# Patient Record
Sex: MALE | URBAN - METROPOLITAN AREA
[De-identification: names, ages, dates, MRNs, and addresses within clinical notes are randomized per-mention and may not be internally consistent; named-entity substitution may affect disease eponyms.]

---

## 2017-09-06 ENCOUNTER — APPOINTMENT (OUTPATIENT)
Age: 57
Setting detail: DERMATOLOGY
End: 2017-09-09

## 2017-09-06 DIAGNOSIS — L82.0 INFLAMED SEBORRHEIC KERATOSIS: ICD-10-CM

## 2017-09-06 DIAGNOSIS — Z85.828 PERSONAL HISTORY OF OTHER MALIGNANT NEOPLASM OF SKIN: ICD-10-CM

## 2017-09-06 DIAGNOSIS — L57.0 ACTINIC KERATOSIS: ICD-10-CM

## 2017-09-06 PROCEDURE — 99202 OFFICE O/P NEW SF 15 MIN: CPT | Mod: 25

## 2017-09-06 PROCEDURE — OTHER COUNSELING: OTHER

## 2017-09-06 PROCEDURE — 17110 DESTRUCT B9 LESION 1-14: CPT

## 2017-09-06 PROCEDURE — OTHER LIQUID NITROGEN: OTHER

## 2017-09-06 PROCEDURE — OTHER MIPS QUALITY: OTHER

## 2017-09-06 PROCEDURE — OTHER BENIGN DESTRUCTION: OTHER

## 2017-09-06 PROCEDURE — 17000 DESTRUCT PREMALG LESION: CPT | Mod: 59

## 2017-09-06 ASSESSMENT — LOCATION DETAILED DESCRIPTION DERM
LOCATION DETAILED: RIGHT MEDIAL DISTAL PRETIBIAL REGION
LOCATION DETAILED: RIGHT SUPERIOR CENTRAL MALAR CHEEK
LOCATION DETAILED: LEFT CENTRAL FRONTAL SCALP
LOCATION DETAILED: RIGHT SUPERIOR MEDIAL MALAR CHEEK

## 2017-09-06 ASSESSMENT — LOCATION SIMPLE DESCRIPTION DERM
LOCATION SIMPLE: LEFT SCALP
LOCATION SIMPLE: RIGHT CHEEK
LOCATION SIMPLE: RIGHT PRETIBIAL REGION

## 2017-09-06 ASSESSMENT — LOCATION ZONE DERM
LOCATION ZONE: LEG
LOCATION ZONE: SCALP
LOCATION ZONE: FACE

## 2017-09-06 NOTE — PROCEDURE: LIQUID NITROGEN
Consent: The patient's consent was obtained including but not limited to risks of crusting, scabbing, blistering, scarring, darker or lighter pigmentary change, recurrence, incomplete removal and infection.
Duration Of Freeze Thaw-Cycle (Seconds): 15
Detail Level: Detailed
Post-Care Instructions: I reviewed with the patient in detail post-care instructions. Patient is to wear sunprotection, and avoid picking at any of the treated lesions. Pt may apply Vaseline to crusted or scabbing areas.
Render Post-Care Instructions In Note?: yes
Number Of Freeze-Thaw Cycles: 1 freeze-thaw cycle

## 2017-09-06 NOTE — PROCEDURE: BENIGN DESTRUCTION
Add 52 Modifier (Optional): no
Post-Care Instructions: I reviewed with the patient in detail post-care instructions. Patient is to wear sunprotection, and avoid picking at any of the treated lesions. Pt may apply Vaseline to crusted or scabbing areas.
Medical Necessity Clause: This procedure was medically necessary because the lesions that were treated were:
Render Post-Care Instructions In Note?: yes
Treatment Number (Will Not Render If 0): 1
Consent: The patient's consent was obtained including but not limited to risks of crusting, scabbing, blistering, scarring, darker or lighter pigmentary change, recurrence, incomplete removal and infection.
Medical Necessity Information: It is in your best interest to select a reason for this procedure from the list below. All of these items fulfill various CMS LCD requirements except the new and changing color options.
Detail Level: Detailed
Anesthesia Volume In Cc: 0.3

## 2017-09-06 NOTE — HPI: NON-MELANOMA SKIN CANCER F/U (HISTORY OF NMSC)
What Is The Reason For Today's Visit?: Surveillance against skin cancer recurrences
How Many Skin Cancers Have You Had?: more than one
When Was Your Last Cancer Diagnosed?: March 2017

## 2017-09-06 NOTE — PROCEDURE: MIPS QUALITY
Detail Level: Detailed
Quality 131: Pain Assessment And Follow-Up: Pain assessment using a standardized tool is documented as negative, no follow-up plan required
Quality 431: Preventive Care And Screening: Unhealthy Alcohol Use - Screening: Patient screened for unhealthy alcohol use using a single question and scores less than 2 times per year
Quality 110: Preventive Care And Screening: Influenza Immunization: Influenza Immunization previously received during influenza season
Quality 226: Preventive Care And Screening: Tobacco Use: Screening And Cessation Intervention: Patient screened for tobacco and never smoked
Quality 130: Documentation Of Current Medications In The Medical Record: Current Medications Documented

## 2018-04-25 ENCOUNTER — APPOINTMENT (OUTPATIENT)
Age: 58
Setting detail: DERMATOLOGY
End: 2018-05-02

## 2018-04-25 DIAGNOSIS — L82.1 OTHER SEBORRHEIC KERATOSIS: ICD-10-CM

## 2018-04-25 DIAGNOSIS — D22 MELANOCYTIC NEVI: ICD-10-CM

## 2018-04-25 DIAGNOSIS — L81.4 OTHER MELANIN HYPERPIGMENTATION: ICD-10-CM

## 2018-04-25 DIAGNOSIS — L82.0 INFLAMED SEBORRHEIC KERATOSIS: ICD-10-CM

## 2018-04-25 DIAGNOSIS — D18.0 HEMANGIOMA: ICD-10-CM

## 2018-04-25 PROBLEM — D48.5 NEOPLASM OF UNCERTAIN BEHAVIOR OF SKIN: Status: ACTIVE | Noted: 2018-04-25

## 2018-04-25 PROBLEM — D22.5 MELANOCYTIC NEVI OF TRUNK: Status: ACTIVE | Noted: 2018-04-25

## 2018-04-25 PROBLEM — D18.01 HEMANGIOMA OF SKIN AND SUBCUTANEOUS TISSUE: Status: ACTIVE | Noted: 2018-04-25

## 2018-04-25 PROCEDURE — OTHER COUNSELING: OTHER

## 2018-04-25 PROCEDURE — 99213 OFFICE O/P EST LOW 20 MIN: CPT | Mod: 25

## 2018-04-25 PROCEDURE — OTHER MIPS QUALITY: OTHER

## 2018-04-25 PROCEDURE — OTHER BIOPSY BY SHAVE METHOD: OTHER

## 2018-04-25 PROCEDURE — 11100: CPT | Mod: 59

## 2018-04-25 PROCEDURE — 17110 DESTRUCT B9 LESION 1-14: CPT

## 2018-04-25 PROCEDURE — OTHER LIQUID NITROGEN: OTHER

## 2018-04-25 ASSESSMENT — LOCATION DETAILED DESCRIPTION DERM
LOCATION DETAILED: LEFT CLAVICULAR NECK
LOCATION DETAILED: RIGHT CLAVICULAR NECK
LOCATION DETAILED: RIGHT INFERIOR UPPER BACK
LOCATION DETAILED: RIGHT SUPERIOR UPPER BACK
LOCATION DETAILED: MID-FRONTAL SCALP
LOCATION DETAILED: RIGHT MID-UPPER BACK
LOCATION DETAILED: INFERIOR THORACIC SPINE

## 2018-04-25 ASSESSMENT — LOCATION ZONE DERM
LOCATION ZONE: TRUNK
LOCATION ZONE: NECK
LOCATION ZONE: SCALP

## 2018-04-25 ASSESSMENT — LOCATION SIMPLE DESCRIPTION DERM
LOCATION SIMPLE: RIGHT UPPER BACK
LOCATION SIMPLE: UPPER BACK
LOCATION SIMPLE: ANTERIOR SCALP
LOCATION SIMPLE: RIGHT ANTERIOR NECK
LOCATION SIMPLE: LEFT ANTERIOR NECK

## 2018-04-25 NOTE — PROCEDURE: MIPS QUALITY
Detail Level: Detailed
Quality 110: Preventive Care And Screening: Influenza Immunization: Influenza Immunization Administered during Influenza season
Quality 226: Preventive Care And Screening: Tobacco Use: Screening And Cessation Intervention: Patient screened for tobacco and never smoked
Quality 130: Documentation Of Current Medications In The Medical Record: Current Medications Documented
Quality 265: Biopsy Follow-Up: Biopsy results reviewed, communicated, tracked, and documented
Quality 431: Preventive Care And Screening: Unhealthy Alcohol Use - Screening: Patient screened for unhealthy alcohol use using a single question and scores less than 2 times per year

## 2018-04-25 NOTE — PROCEDURE: LIQUID NITROGEN
Consent: The patient's verbal consent was obtained including but not limited to risks of crusting, scabbing, blistering, scarring, darker or lighter pigmentary change, recurrence, incomplete removal and infection.
Number Of Freeze-Thaw Cycles: 1 freeze-thaw cycle
Medical Necessity Clause: This procedure was medically necessary because the lesions that were treated were:
Duration Of Freeze Thaw-Cycle (Seconds): 15
Detail Level: Simple
Add 52 Modifier (Optional): no
Post-Care Instructions: I reviewed with the patient in detail post-care instructions. Patient is to wear sunprotection, and avoid picking at any of the treated lesions. Pt may apply Vaseline to crusted or scabbing areas.
Total Number Of Lesions Treated: 4
Medical Necessity Information: It is in your best interest to select a reason for this procedure from the list below. All of these items fulfill various CMS LCD requirements except the new and changing color options.
Render Post Care In The Note?: yes

## 2018-04-25 NOTE — PROCEDURE: BIOPSY BY SHAVE METHOD
Detail Level: Detailed
Render Post-Care Instructions In Note?: yes
Curettage Text: The wound bed was treated with curettage after the biopsy was performed.
Additional Anesthesia Volume In Cc (Will Not Render If 0): 0
Biopsy Method: double edge Personna blade
Hemostasis: Drysol
Anesthesia Type: 1% lidocaine with epinephrine
Post-Care Instructions: I reviewed with the patient in detail post-care instructions. Patient is to keep the biopsy site cover and dry overnight, and then apply H2O2,  vaseline  and a bandage daily until healed.
Silver Nitrate Text: The wound bed was treated with silver nitrate after the biopsy was performed.
Notification Instructions: Patient will be notified of biopsy results. However, patient instructed to call the office if not contacted within 2 weeks.
Size Of Lesion In Cm: 0.4
Electrodesiccation And Curettage Text: The wound bed was treated with electrodesiccation and curettage after the biopsy was performed.
Anesthesia Volume In Cc (Will Not Render If 0): 0.3
Billing Type: Third-Party Bill
Dressing: bandage
Type Of Destruction Used: Electrodesiccation
Biopsy Type: H and E
Bill For Surgical Tray: no
Consent: Written consent was obtained and risks were reviewed including but not limited to scarring, infection, bleeding, scabbing, incomplete removal, nerve damage and allergy to anesthesia.
Electrodesiccation Text: The wound bed was treated with electrodesiccation after the biopsy was performed.
Body Location Override (Optional - Billing Will Still Be Based On Selected Body Map Location If Applicable): mid upper back
Cryotherapy Text: The wound bed was treated with cryotherapy after the biopsy was performed.
Wound Care: Vaseline

## 2020-09-11 ENCOUNTER — APPOINTMENT (OUTPATIENT)
Dept: URBAN - METROPOLITAN AREA CLINIC 255 | Age: 60
Setting detail: DERMATOLOGY
End: 2020-09-12

## 2020-09-11 DIAGNOSIS — D485 NEOPLASM OF UNCERTAIN BEHAVIOR OF SKIN: ICD-10-CM

## 2020-09-11 DIAGNOSIS — L82.1 OTHER SEBORRHEIC KERATOSIS: ICD-10-CM

## 2020-09-11 DIAGNOSIS — L82.0 INFLAMED SEBORRHEIC KERATOSIS: ICD-10-CM

## 2020-09-11 PROBLEM — D48.5 NEOPLASM OF UNCERTAIN BEHAVIOR OF SKIN: Status: ACTIVE | Noted: 2020-09-11

## 2020-09-11 PROCEDURE — 99213 OFFICE O/P EST LOW 20 MIN: CPT | Mod: 25

## 2020-09-11 PROCEDURE — OTHER LIQUID NITROGEN: OTHER

## 2020-09-11 PROCEDURE — 17110 DESTRUCT B9 LESION 1-14: CPT

## 2020-09-11 PROCEDURE — 11102 TANGNTL BX SKIN SINGLE LES: CPT | Mod: 59

## 2020-09-11 PROCEDURE — OTHER COUNSELING: OTHER

## 2020-09-11 PROCEDURE — OTHER BIOPSY BY SHAVE METHOD: OTHER

## 2020-09-11 PROCEDURE — OTHER MIPS QUALITY: OTHER

## 2020-09-11 ASSESSMENT — LOCATION DETAILED DESCRIPTION DERM
LOCATION DETAILED: LEFT LATERAL UPPER BACK
LOCATION DETAILED: STERNUM
LOCATION DETAILED: LEFT ANTERIOR PROXIMAL THIGH
LOCATION DETAILED: INFERIOR THORACIC SPINE
LOCATION DETAILED: LEFT MID-UPPER BACK
LOCATION DETAILED: RIGHT LATERAL SUPERIOR CHEST
LOCATION DETAILED: LEFT LATERAL SUPERIOR CHEST

## 2020-09-11 ASSESSMENT — LOCATION ZONE DERM
LOCATION ZONE: TRUNK
LOCATION ZONE: LEG

## 2020-09-11 ASSESSMENT — LOCATION SIMPLE DESCRIPTION DERM
LOCATION SIMPLE: LEFT UPPER BACK
LOCATION SIMPLE: LEFT THIGH
LOCATION SIMPLE: UPPER BACK
LOCATION SIMPLE: CHEST

## 2020-09-11 NOTE — PROCEDURE: BIOPSY BY SHAVE METHOD
Destruction After The Procedure: No
Dressing: bandage
Depth Of Biopsy: dermis
Anesthesia Volume In Cc (Will Not Render If 0): 0.5
Information: Selecting Yes will display possible errors in your note based on the variables you have selected. This validation is only offered as a suggestion for you. PLEASE NOTE THAT THE VALIDATION TEXT WILL BE REMOVED WHEN YOU FINALIZE YOUR NOTE. IF YOU WANT TO FAX A PRELIMINARY NOTE YOU WILL NEED TO TOGGLE THIS TO 'NO' IF YOU DO NOT WANT IT IN YOUR FAXED NOTE.
Billing Type: Third-Party Bill
Additional Anesthesia Volume In Cc (Will Not Render If 0): 0
Silver Nitrate Text: The wound bed was treated with silver nitrate after the biopsy was performed.
Was A Bandage Applied: Yes
Electrodesiccation And Curettage Text: The wound bed was treated with electrodesiccation and curettage after the biopsy was performed.
Type Of Destruction Used: Curettage
Biopsy Type: H and E
Electrodesiccation Text: The wound bed was treated with electrodesiccation after the biopsy was performed.
Hemostasis: Drysol
Cryotherapy Text: The wound bed was treated with cryotherapy after the biopsy was performed.
Consent: Written consent was obtained and risks were reviewed including but not limited to scarring, infection, bleeding, scabbing, incomplete removal, nerve damage and allergy to anesthesia.
X Size Of Lesion In Cm: 0.7
Path Notes (To The Dermatopathologist): Please check margins
Anesthesia Type: 1% lidocaine with epinephrine and a 1:10 solution of 8.4% sodium bicarbonate
Curettage Text: The wound bed was treated with curettage after the biopsy was performed.
Biopsy Method: double edge Personna blade
Notification Instructions: Patient will be notified of biopsy results. However, patient instructed to call the office if not contacted within 2 weeks.
Size Of Lesion In Cm: 1
Detail Level: Detailed
Body Location Override (Optional - Billing Will Still Be Based On Selected Body Map Location If Applicable): Mid Upper Back
Wound Care: Petrolatum
Post-Care Instructions: I reviewed with the patient in detail post-care instructions. Patient is to keep the biopsy site dry overnight, and then apply bacitracin twice daily until healed. Patient may apply hydrogen peroxide soaks to remove any crusting.

## 2020-09-11 NOTE — PROCEDURE: MIPS QUALITY
Quality 431: Preventive Care And Screening: Unhealthy Alcohol Use - Screening: Patient screened for unhealthy alcohol use using a single question and scores less than 2 times per year
Quality 226: Preventive Care And Screening: Tobacco Use: Screening And Cessation Intervention: Patient screened for tobacco use and is an ex/non-smoker
Quality 265: Biopsy Follow-Up: Biopsy results reviewed, communicated, tracked, and documented
Detail Level: Detailed
Quality 130: Documentation Of Current Medications In The Medical Record: Current Medications Documented
Quality 110: Preventive Care And Screening: Influenza Immunization: Influenza Immunization not Administered because Patient Refused.

## 2020-09-11 NOTE — PROCEDURE: LIQUID NITROGEN
Duration Of Freeze Thaw-Cycle (Seconds): 15-20
Post-Care Instructions: I reviewed with the patient in detail post-care instructions. Patient is to wear sunprotection, and avoid picking at any of the treated lesions. Pt may apply Vaseline to crusted or scabbing areas.
Include Z78.9 (Other Specified Conditions Influencing Health Status) As An Associated Diagnosis?: No
Number Of Freeze-Thaw Cycles: 1 freeze-thaw cycle
Consent: The patient's consent was obtained including but not limited to risks of crusting, scabbing, blistering, scarring, darker or lighter pigmentary change, recurrence, incomplete removal and infection.
Medical Necessity Clause: This procedure was medically necessary because the lesions that were treated were:
Medical Necessity Information: It is in your best interest to select a reason for this procedure from the list below. All of these items fulfill various CMS LCD requirements except the new and changing color options.
Detail Level: Detailed

## 2020-10-01 ENCOUNTER — APPOINTMENT (OUTPATIENT)
Dept: URBAN - METROPOLITAN AREA CLINIC 255 | Age: 60
Setting detail: DERMATOLOGY
End: 2020-10-08

## 2020-10-01 DIAGNOSIS — L82.0 INFLAMED SEBORRHEIC KERATOSIS: ICD-10-CM

## 2020-10-01 PROBLEM — C44.519 BASAL CELL CARCINOMA OF SKIN OF OTHER PART OF TRUNK: Status: ACTIVE | Noted: 2020-10-01

## 2020-10-01 PROCEDURE — OTHER CURETTAGE AND DESTRUCTION: OTHER

## 2020-10-01 PROCEDURE — OTHER COUNSELING: OTHER

## 2020-10-01 PROCEDURE — OTHER MIPS QUALITY: OTHER

## 2020-10-01 PROCEDURE — 17262 DSTRJ MAL LES T/A/L 1.1-2.0: CPT

## 2020-10-01 PROCEDURE — 17110 DESTRUCT B9 LESION 1-14: CPT | Mod: 59

## 2020-10-01 PROCEDURE — OTHER LIQUID NITROGEN: OTHER

## 2020-10-01 ASSESSMENT — LOCATION DETAILED DESCRIPTION DERM
LOCATION DETAILED: RIGHT INFERIOR MEDIAL UPPER BACK
LOCATION DETAILED: RIGHT SUPERIOR MEDIAL MIDBACK
LOCATION DETAILED: INFERIOR THORACIC SPINE
LOCATION DETAILED: LEFT INFERIOR UPPER BACK
LOCATION DETAILED: LEFT LATERAL UPPER BACK
LOCATION DETAILED: LEFT SUPERIOR UPPER BACK
LOCATION DETAILED: RIGHT SUPERIOR LATERAL MIDBACK
LOCATION DETAILED: SUPERIOR THORACIC SPINE
LOCATION DETAILED: LEFT MID-UPPER BACK
LOCATION DETAILED: RIGHT MID-UPPER BACK
LOCATION DETAILED: RIGHT INFERIOR UPPER BACK

## 2020-10-01 ASSESSMENT — LOCATION SIMPLE DESCRIPTION DERM
LOCATION SIMPLE: RIGHT LOWER BACK
LOCATION SIMPLE: UPPER BACK
LOCATION SIMPLE: LEFT UPPER BACK
LOCATION SIMPLE: RIGHT UPPER BACK

## 2020-10-01 ASSESSMENT — LOCATION ZONE DERM: LOCATION ZONE: TRUNK

## 2020-10-01 NOTE — PROCEDURE: MIPS QUALITY
Quality 110: Preventive Care And Screening: Influenza Immunization: Influenza Immunization not Administered because Patient Refused.
Quality 431: Preventive Care And Screening: Unhealthy Alcohol Use - Screening: Patient screened for unhealthy alcohol use using a single question and scores less than 2 times per year
Detail Level: Detailed
Quality 143: Oncology: Medical And Radiation- Pain Intensity Quantified: Pain severity quantified, no pain present
Quality 226: Preventive Care And Screening: Tobacco Use: Screening And Cessation Intervention: Patient screened for tobacco use and is an ex/non-smoker
Quality 130: Documentation Of Current Medications In The Medical Record: Current Medications Documented

## 2020-10-01 NOTE — PROCEDURE: LIQUID NITROGEN
Add 52 Modifier (Optional): no
Detail Level: Detailed
Consent: The patient's consent was obtained including but not limited to risks of crusting, scabbing, blistering, scarring, darker or lighter pigmentary change, recurrence, incomplete removal and infection.
Number Of Freeze-Thaw Cycles: 1 freeze-thaw cycle
Duration Of Freeze Thaw-Cycle (Seconds): 15-20
Post-Care Instructions: I reviewed with the patient in detail post-care instructions. Patient is to wear sunprotection, and avoid picking at any of the treated lesions. Pt may apply Vaseline to crusted or scabbing areas.
Medical Necessity Information: It is in your best interest to select a reason for this procedure from the list below. All of these items fulfill various CMS LCD requirements except the new and changing color options.
Medical Necessity Clause: This procedure was medically necessary because the lesions that were treated were:

## 2020-10-01 NOTE — PROCEDURE: CURETTAGE AND DESTRUCTION
Hide Accession Number?: No
Anesthesia Volume In Cc: 2
Consent was obtained from the patient. The risks, benefits and alternatives to therapy were discussed in detail. Specifically, the risks of infection, scarring, bleeding, prolonged wound healing, nerve injury, incomplete removal, allergy to anesthesia and recurrence were addressed. Alternatives to ED&C, such as: surgical removal and XRT were also discussed.  Prior to the procedure, the treatment site was clearly identified and confirmed by the patient. All components of Universal Protocol/PAUSE Rule completed.
What Was Performed First?: Curettage
Bill As A Line Item Or As Units: Line Item
Post-Care Instructions: I reviewed with the patient in detail post-care instructions. Patient is to keep the area dry for 48 hours, and not to engage in any swimming until the area is healed. Should the patient develop any fevers, chills, bleeding, severe pain patient will contact the office immediately.
Size Of Lesion After Curettage: 1.8
Size Of Lesion In Cm: 1.2
Additional Information: (Optional): The wound was cleaned, and vaseline and a bandage were applied.  The patient received detailed post-op instructions.
Detail Level: Detailed
Body Location Override (Optional - Billing Will Still Be Based On Selected Body Map Location If Applicable): Mid Upper Back
Number Of Curettages: 3
Anesthesia Type: 1% lidocaine with epinephrine and a 1:10 solution of 8.4% sodium bicarbonate
Cautery Type: electrodesiccation
Total Volume (Ccs): 1

## 2021-12-27 ENCOUNTER — APPOINTMENT (OUTPATIENT)
Dept: URBAN - METROPOLITAN AREA CLINIC 255 | Age: 61
Setting detail: DERMATOLOGY
End: 2021-12-28

## 2021-12-27 DIAGNOSIS — L82.1 OTHER SEBORRHEIC KERATOSIS: ICD-10-CM

## 2021-12-27 DIAGNOSIS — Z85.828 PERSONAL HISTORY OF OTHER MALIGNANT NEOPLASM OF SKIN: ICD-10-CM

## 2021-12-27 DIAGNOSIS — L82.0 INFLAMED SEBORRHEIC KERATOSIS: ICD-10-CM

## 2021-12-27 DIAGNOSIS — D18.0 HEMANGIOMA: ICD-10-CM

## 2021-12-27 DIAGNOSIS — L81.4 OTHER MELANIN HYPERPIGMENTATION: ICD-10-CM

## 2021-12-27 DIAGNOSIS — L91.8 OTHER HYPERTROPHIC DISORDERS OF THE SKIN: ICD-10-CM

## 2021-12-27 DIAGNOSIS — D22 MELANOCYTIC NEVI: ICD-10-CM

## 2021-12-27 PROBLEM — D22.5 MELANOCYTIC NEVI OF TRUNK: Status: ACTIVE | Noted: 2021-12-27

## 2021-12-27 PROBLEM — D18.01 HEMANGIOMA OF SKIN AND SUBCUTANEOUS TISSUE: Status: ACTIVE | Noted: 2021-12-27

## 2021-12-27 PROCEDURE — 99213 OFFICE O/P EST LOW 20 MIN: CPT | Mod: 25

## 2021-12-27 PROCEDURE — 17110 DESTRUCT B9 LESION 1-14: CPT

## 2021-12-27 PROCEDURE — OTHER LIQUID NITROGEN: OTHER

## 2021-12-27 PROCEDURE — OTHER MIPS QUALITY: OTHER

## 2021-12-27 PROCEDURE — OTHER COUNSELING: OTHER

## 2021-12-27 PROCEDURE — OTHER SKIN TAG REMOVAL: OTHER

## 2021-12-27 PROCEDURE — 11200 RMVL SKIN TAGS UP TO&INC 15: CPT | Mod: 59

## 2021-12-27 ASSESSMENT — LOCATION DETAILED DESCRIPTION DERM
LOCATION DETAILED: LEFT SUPERIOR FOREHEAD
LOCATION DETAILED: LEFT AXILLARY VAULT
LOCATION DETAILED: LEFT ANTERIOR PROXIMAL THIGH
LOCATION DETAILED: RIGHT AXILLARY VAULT
LOCATION DETAILED: LEFT SUPERIOR MEDIAL UPPER BACK
LOCATION DETAILED: RIGHT ANTERIOR AXILLA
LOCATION DETAILED: RIGHT INFERIOR MEDIAL UPPER BACK
LOCATION DETAILED: SUPERIOR MID FOREHEAD
LOCATION DETAILED: RIGHT MEDIAL UPPER BACK
LOCATION DETAILED: LEFT ANKLE
LOCATION DETAILED: RIGHT DISTAL PRETIBIAL REGION

## 2021-12-27 ASSESSMENT — LOCATION SIMPLE DESCRIPTION DERM
LOCATION SIMPLE: RIGHT ANTERIOR AXILLA
LOCATION SIMPLE: RIGHT AXILLARY VAULT
LOCATION SIMPLE: LEFT UPPER BACK
LOCATION SIMPLE: SUPERIOR FOREHEAD
LOCATION SIMPLE: RIGHT PRETIBIAL REGION
LOCATION SIMPLE: LEFT ANKLE
LOCATION SIMPLE: LEFT THIGH
LOCATION SIMPLE: RIGHT UPPER BACK
LOCATION SIMPLE: LEFT AXILLARY VAULT
LOCATION SIMPLE: LEFT FOREHEAD

## 2021-12-27 ASSESSMENT — LOCATION ZONE DERM
LOCATION ZONE: AXILLAE
LOCATION ZONE: FACE
LOCATION ZONE: TRUNK
LOCATION ZONE: LEG

## 2021-12-27 NOTE — PROCEDURE: LIQUID NITROGEN
Render Post-Care Instructions In Note?: no
Consent: The patient's consent was obtained including but not limited to risks of crusting, scabbing, blistering, scarring, darker or lighter pigmentary change, recurrence, incomplete removal and infection.
Number Of Freeze-Thaw Cycles: 1 freeze-thaw cycle
Post-Care Instructions: I reviewed with the patient in detail post-care instructions. Patient is to wear sunprotection, and avoid picking at any of the treated lesions. Pt may apply Vaseline to crusted or scabbing areas.
Medical Necessity Information: It is in your best interest to select a reason for this procedure from the list below. All of these items fulfill various CMS LCD requirements except the new and changing color options.
Detail Level: Detailed
Show Aperture Variable?: Yes
Medical Necessity Clause: This procedure was medically necessary because the lesions that were treated were:
Duration Of Freeze Thaw-Cycle (Seconds): 15-20

## 2021-12-27 NOTE — PROCEDURE: SKIN TAG REMOVAL
Detail Level: Detailed
Consent: Written consent obtained and the risks of skin tag removal was reviewed with the patient including but not limited to bleeding, pigmentary change, infection, pain, and remote possibility of scarring.
Medical Necessity Information: It is in your best interest to select a reason for this procedure from the list below. All of these items fulfill various CMS LCD requirements except the new and changing color options.
Include Z78.9 (Other Specified Conditions Influencing Health Status) As An Associated Diagnosis?: No
Anesthesia Volume In Cc: 3
Medical Necessity Clause: This procedure was medically necessary because the lesions that were treated were:
Anesthesia Type: 1% lidocaine with epinephrine
Add Associated Diagnoses If Applicable When Selecting Medical Necessity: Yes

## 2022-07-22 ENCOUNTER — APPOINTMENT (OUTPATIENT)
Dept: URBAN - METROPOLITAN AREA CLINIC 255 | Age: 62
Setting detail: DERMATOLOGY
End: 2022-07-23

## 2022-07-22 DIAGNOSIS — L82.0 INFLAMED SEBORRHEIC KERATOSIS: ICD-10-CM

## 2022-07-22 DIAGNOSIS — D485 NEOPLASM OF UNCERTAIN BEHAVIOR OF SKIN: ICD-10-CM

## 2022-07-22 PROBLEM — D48.5 NEOPLASM OF UNCERTAIN BEHAVIOR OF SKIN: Status: ACTIVE | Noted: 2022-07-22

## 2022-07-22 PROCEDURE — OTHER COUNSELING: OTHER

## 2022-07-22 PROCEDURE — 11102 TANGNTL BX SKIN SINGLE LES: CPT | Mod: 59

## 2022-07-22 PROCEDURE — OTHER LIQUID NITROGEN: OTHER

## 2022-07-22 PROCEDURE — OTHER BIOPSY BY SHAVE METHOD: OTHER

## 2022-07-22 PROCEDURE — 17110 DESTRUCT B9 LESION 1-14: CPT

## 2022-07-22 PROCEDURE — OTHER MIPS QUALITY: OTHER

## 2022-07-22 ASSESSMENT — LOCATION DETAILED DESCRIPTION DERM
LOCATION DETAILED: PERIUMBILICAL SKIN
LOCATION DETAILED: RIGHT DISTAL CALF
LOCATION DETAILED: LEFT CLAVICULAR NECK
LOCATION DETAILED: LEFT MEDIAL MALAR CHEEK
LOCATION DETAILED: LEFT CENTRAL EYEBROW
LOCATION DETAILED: LEFT INFERIOR ANTERIOR NECK

## 2022-07-22 ASSESSMENT — LOCATION ZONE DERM
LOCATION ZONE: LEG
LOCATION ZONE: FACE
LOCATION ZONE: TRUNK
LOCATION ZONE: NECK

## 2022-07-22 ASSESSMENT — LOCATION SIMPLE DESCRIPTION DERM
LOCATION SIMPLE: RIGHT CALF
LOCATION SIMPLE: LEFT CHEEK
LOCATION SIMPLE: ABDOMEN
LOCATION SIMPLE: LEFT ANTERIOR NECK
LOCATION SIMPLE: LEFT EYEBROW

## 2022-07-22 NOTE — PROCEDURE: MIPS QUALITY
Quality 130: Documentation Of Current Medications In The Medical Record: Current Medications Documented
Detail Level: Detailed
Quality 110: Preventive Care And Screening: Influenza Immunization: Influenza Immunization not Administered because Patient Refused.
Quality 265: Biopsy Follow-Up: Biopsy results reviewed, communicated, tracked, and documented
Quality 226: Preventive Care And Screening: Tobacco Use: Screening And Cessation Intervention: Patient screened for tobacco use and is an ex/non-smoker
Quality 431: Preventive Care And Screening: Unhealthy Alcohol Use - Screening: Patient not identified as an unhealthy alcohol user when screened for unhealthy alcohol use using a systematic screening method

## 2022-07-22 NOTE — PROCEDURE: LIQUID NITROGEN
Spray Paint Text: The liquid nitrogen was applied to the skin utilizing a spray paint frosting technique.
Detail Level: Detailed
Include Z78.9 (Other Specified Conditions Influencing Health Status) As An Associated Diagnosis?: No
Show Aperture Variable?: Yes
Pared With?: 15 blade
Post-Care Instructions: I reviewed with the patient in detail post-care instructions. Patient is to wear sunprotection, and avoid picking at any of the treated lesions. Pt may apply Vaseline to crusted or scabbing areas.
Duration Of Freeze Thaw-Cycle (Seconds): 15-20
Consent: The patient's consent was obtained including but not limited to risks of crusting, scabbing, blistering, scarring, darker or lighter pigmentary change, recurrence, incomplete removal and infection.
Medical Necessity Information: It is in your best interest to select a reason for this procedure from the list below. All of these items fulfill various CMS LCD requirements except the new and changing color options.
Medical Necessity Clause: This procedure was medically necessary because the lesions that were treated were:
Number Of Freeze-Thaw Cycles: 1 freeze-thaw cycle

## 2022-07-22 NOTE — PROCEDURE: BIOPSY BY SHAVE METHOD
Dressing: Band-Aid
Hide Accession Number?: No
Consent: Written consent was obtained and risks were reviewed including but not limited to scarring, infection, bleeding, scabbing, incomplete removal, nerve damage and allergy to anesthesia.
Detail Level: Detailed
Path Notes (To The Dermatopathologist): Please Check Margins
Information: Selecting Yes will display possible errors in your note based on the variables you have selected. This validation is only offered as a suggestion for you. PLEASE NOTE THAT THE VALIDATION TEXT WILL BE REMOVED WHEN YOU FINALIZE YOUR NOTE. IF YOU WANT TO FAX A PRELIMINARY NOTE YOU WILL NEED TO TOGGLE THIS TO 'NO' IF YOU DO NOT WANT IT IN YOUR FAXED NOTE.
Electrodesiccation And Curettage Text: The wound bed was treated with electrodesiccation and curettage after the biopsy was performed.
Notification Instructions: Patient will be notified of biopsy results. However, patient instructed to call the office if not contacted within 2 weeks.
Anesthesia Volume In Cc (Will Not Render If 0): 0.5
Post-Care Instructions: I reviewed with the patient in detail post-care instructions. Patient is to keep the biopsy site dry overnight, and then apply bacitracin twice daily until healed. Patient may apply hydrogen peroxide soaks to remove any crusting.
Additional Anesthesia Volume In Cc (Will Not Render If 0): 0
Body Location Override (Optional - Billing Will Still Be Based On Selected Body Map Location If Applicable): L Medial Eyebrow
Depth Of Biopsy: dermis
Electrodesiccation Text: The wound bed was treated with electrodesiccation after the biopsy was performed.
Validate Note Data (See Information Below): Yes
Hemostasis: Aluminum Chloride and Electrocautery
Cryotherapy Text: The wound bed was treated with cryotherapy after the biopsy was performed.
Billing Type: Third-Party Bill
Type Of Destruction Used: Curettage
Anesthesia Type: 1% lidocaine with epinephrine and a 1:10 solution of 8.4% sodium bicarbonate
Biopsy Type: H and E
Wound Care: Petrolatum
Biopsy Method: double edge Personna blade
X Size Of Lesion In Cm: 0.7
Silver Nitrate Text: The wound bed was treated with silver nitrate after the biopsy was performed.
Curettage Text: The wound bed was treated with curettage after the biopsy was performed.

## 2023-04-13 ENCOUNTER — APPOINTMENT (OUTPATIENT)
Dept: URBAN - METROPOLITAN AREA CLINIC 259 | Age: 63
Setting detail: DERMATOLOGY
End: 2023-04-13

## 2023-04-13 DIAGNOSIS — L57.0 ACTINIC KERATOSIS: ICD-10-CM

## 2023-04-13 DIAGNOSIS — L82.0 INFLAMED SEBORRHEIC KERATOSIS: ICD-10-CM

## 2023-04-13 PROCEDURE — 17110 DESTRUCT B9 LESION 1-14: CPT

## 2023-04-13 PROCEDURE — 17000 DESTRUCT PREMALG LESION: CPT | Mod: 59

## 2023-04-13 PROCEDURE — OTHER MIPS QUALITY: OTHER

## 2023-04-13 PROCEDURE — OTHER LIQUID NITROGEN: OTHER

## 2023-04-13 PROCEDURE — OTHER COUNSELING: OTHER

## 2023-04-13 ASSESSMENT — LOCATION DETAILED DESCRIPTION DERM
LOCATION DETAILED: LEFT MEDIAL UPPER BACK
LOCATION DETAILED: RIGHT CLAVICULAR NECK
LOCATION DETAILED: RIGHT INFERIOR UPPER BACK
LOCATION DETAILED: LEFT ANTERIOR DISTAL THIGH
LOCATION DETAILED: LEFT CLAVICULAR NECK
LOCATION DETAILED: LEFT SUPERIOR LATERAL LOWER BACK
LOCATION DETAILED: RIGHT INFERIOR LATERAL UPPER BACK
LOCATION DETAILED: LEFT DISTAL RADIAL DORSAL FOREARM

## 2023-04-13 ASSESSMENT — LOCATION ZONE DERM
LOCATION ZONE: LEG
LOCATION ZONE: TRUNK
LOCATION ZONE: ARM
LOCATION ZONE: NECK

## 2023-04-13 ASSESSMENT — LOCATION SIMPLE DESCRIPTION DERM
LOCATION SIMPLE: LEFT LOWER BACK
LOCATION SIMPLE: LEFT UPPER BACK
LOCATION SIMPLE: LEFT ANTERIOR NECK
LOCATION SIMPLE: LEFT FOREARM
LOCATION SIMPLE: RIGHT UPPER BACK
LOCATION SIMPLE: RIGHT ANTERIOR NECK
LOCATION SIMPLE: LEFT THIGH

## 2023-04-13 NOTE — PROCEDURE: LIQUID NITROGEN
Post-Care Instructions: I reviewed with the patient in detail post-care instructions. Patient is to wear sunprotection, and avoid picking at any of the treated lesions. Pt may apply Vaseline to crusted or scabbing areas.
Consent: The patient's consent was obtained including but not limited to risks of crusting, scabbing, blistering, scarring, darker or lighter pigmentary change, recurrence, incomplete removal and infection.
Render Note In Bullet Format When Appropriate: No
Duration Of Freeze Thaw-Cycle (Seconds): 0
Medical Necessity Information: It is in your best interest to select a reason for this procedure from the list below. All of these items fulfill various CMS LCD requirements except the new and changing color options.
Show Applicator Variable?: Yes
Spray Paint Text: The liquid nitrogen was applied to the skin utilizing a spray paint frosting technique.
Detail Level: Detailed
Medical Necessity Clause: This procedure was medically necessary because the lesions that were treated were:

## 2023-04-13 NOTE — HPI: SKIN LESION
What Type Of Note Output Would You Prefer (Optional)?: Standard Output
Is This A New Presentation, Or A Follow-Up?: Skin Lesions
Additional History: Pt states he has moles on his back, leg, neck, and arms that has been present for years, are raised, and periodically itchy.

## 2024-09-13 ENCOUNTER — APPOINTMENT (OUTPATIENT)
Dept: URBAN - METROPOLITAN AREA CLINIC 259 | Age: 64
Setting detail: DERMATOLOGY
End: 2024-09-16

## 2024-09-13 DIAGNOSIS — L91.8 OTHER HYPERTROPHIC DISORDERS OF THE SKIN: ICD-10-CM

## 2024-09-13 DIAGNOSIS — L57.8 OTHER SKIN CHANGES DUE TO CHRONIC EXPOSURE TO NONIONIZING RADIATION: ICD-10-CM

## 2024-09-13 DIAGNOSIS — L82.0 INFLAMED SEBORRHEIC KERATOSIS: ICD-10-CM

## 2024-09-13 DIAGNOSIS — D22 MELANOCYTIC NEVI: ICD-10-CM

## 2024-09-13 DIAGNOSIS — Z71.89 OTHER SPECIFIED COUNSELING: ICD-10-CM

## 2024-09-13 DIAGNOSIS — L82.1 OTHER SEBORRHEIC KERATOSIS: ICD-10-CM

## 2024-09-13 PROBLEM — D22.5 MELANOCYTIC NEVI OF TRUNK: Status: ACTIVE | Noted: 2024-09-13

## 2024-09-13 PROCEDURE — OTHER MIPS QUALITY: OTHER

## 2024-09-13 PROCEDURE — OTHER LIQUID NITROGEN: OTHER

## 2024-09-13 PROCEDURE — 99213 OFFICE O/P EST LOW 20 MIN: CPT | Mod: 25

## 2024-09-13 PROCEDURE — 17110 DESTRUCT B9 LESION 1-14: CPT

## 2024-09-13 PROCEDURE — 11201 RMVL SKIN TAGS EA ADDL 10: CPT

## 2024-09-13 PROCEDURE — OTHER COUNSELING: OTHER

## 2024-09-13 PROCEDURE — 11200 RMVL SKIN TAGS UP TO&INC 15: CPT | Mod: 59

## 2024-09-13 PROCEDURE — OTHER SKIN TAG REMOVAL: OTHER

## 2024-09-13 ASSESSMENT — LOCATION DETAILED DESCRIPTION DERM
LOCATION DETAILED: RIGHT CLAVICULAR NECK
LOCATION DETAILED: RIGHT CLAVICULAR SKIN
LOCATION DETAILED: LEFT CLAVICULAR SKIN
LOCATION DETAILED: LEFT MEDIAL SUPERIOR CHEST
LOCATION DETAILED: INFERIOR THORACIC SPINE
LOCATION DETAILED: LEFT INFERIOR CENTRAL MALAR CHEEK
LOCATION DETAILED: LEFT AXILLARY VAULT
LOCATION DETAILED: LEFT CLAVICULAR NECK
LOCATION DETAILED: RIGHT INFERIOR LATERAL NECK
LOCATION DETAILED: RIGHT MEDIAL UPPER BACK
LOCATION DETAILED: LEFT CENTRAL LATERAL NECK
LOCATION DETAILED: RIGHT AXILLARY VAULT

## 2024-09-13 ASSESSMENT — LOCATION SIMPLE DESCRIPTION DERM
LOCATION SIMPLE: CHEST
LOCATION SIMPLE: RIGHT CLAVICULAR SKIN
LOCATION SIMPLE: LEFT AXILLARY VAULT
LOCATION SIMPLE: LEFT CLAVICULAR SKIN
LOCATION SIMPLE: LEFT ANTERIOR NECK
LOCATION SIMPLE: NECK
LOCATION SIMPLE: RIGHT ANTERIOR NECK
LOCATION SIMPLE: UPPER BACK
LOCATION SIMPLE: RIGHT UPPER BACK
LOCATION SIMPLE: LEFT CHEEK
LOCATION SIMPLE: RIGHT AXILLARY VAULT

## 2024-09-13 ASSESSMENT — LOCATION ZONE DERM
LOCATION ZONE: AXILLAE
LOCATION ZONE: NECK
LOCATION ZONE: FACE
LOCATION ZONE: TRUNK

## 2024-09-13 NOTE — PROCEDURE: LIQUID NITROGEN
Medical Necessity Clause: This procedure was medically necessary because the lesions that were treated were:
Include Z78.9 (Other Specified Conditions Influencing Health Status) As An Associated Diagnosis?: No
Consent: The patient's consent was obtained including but not limited to risks of crusting, scabbing, blistering, scarring, darker or lighter pigmentary change, recurrence, incomplete removal and infection.
Show Topical Anesthesia Variable?: Yes
Spray Paint Text: The liquid nitrogen was applied to the skin utilizing a spray paint frosting technique.
Medical Necessity Information: It is in your best interest to select a reason for this procedure from the list below. All of these items fulfill various CMS LCD requirements except the new and changing color options.
Detail Level: Detailed
Number Of Freeze-Thaw Cycles: 2 freeze-thaw cycles
Application Tool (Optional): Liquid Nitrogen Sprayer
Duration Of Freeze Thaw-Cycle (Seconds): 5-10
Post-Care Instructions: I reviewed with the patient in detail post-care instructions. Patient is to wear sunprotection, and avoid picking at any of the treated lesions. Pt may apply Vaseline to crusted or scabbing areas.

## 2024-09-13 NOTE — PROCEDURE: SKIN TAG REMOVAL
Medical Necessity Clause: This procedure was medically necessary because the lesions that were treated were:
Consent: Written consent obtained and the risks of skin tag removal was reviewed with the patient including but not limited to bleeding, pigmentary change, infection, pain, and remote possibility of scarring.
Medical Necessity Information: It is in your best interest to select a reason for this procedure from the list below. All of these items fulfill various CMS LCD requirements except the new and changing color options.
Detail Level: Detailed
Add Associated Diagnoses If Applicable When Selecting Medical Necessity: Yes
Include Z78.9 (Other Specified Conditions Influencing Health Status) As An Associated Diagnosis?: No

## 2024-09-13 NOTE — HPI: SKIN LESIONS
How Severe Is Your Skin Lesion?: mild
Have Your Skin Lesions Been Treated?: not been treated
Is This A New Presentation, Or A Follow-Up?: Skin Lesions
Additional History: Pt reports noticing a spots on their chest, armpits, neck, and back that they would like checked today. Pt reports no other concerns at this time.

## 2024-09-26 ENCOUNTER — TRANSFERRED RECORDS (OUTPATIENT)
Dept: HEALTH INFORMATION MANAGEMENT | Facility: CLINIC | Age: 64
End: 2024-09-26
Payer: COMMERCIAL

## 2024-10-08 ENCOUNTER — TRANSCRIBE ORDERS (OUTPATIENT)
Dept: OTHER | Age: 64
End: 2024-10-08

## 2024-10-08 ENCOUNTER — MEDICAL CORRESPONDENCE (OUTPATIENT)
Dept: HEALTH INFORMATION MANAGEMENT | Facility: CLINIC | Age: 64
End: 2024-10-08
Payer: COMMERCIAL

## 2024-10-08 ENCOUNTER — PATIENT OUTREACH (OUTPATIENT)
Dept: ONCOLOGY | Facility: CLINIC | Age: 64
End: 2024-10-08
Payer: COMMERCIAL

## 2024-10-08 DIAGNOSIS — R79.89 ELEVATED FERRITIN: Primary | ICD-10-CM

## 2024-10-08 DIAGNOSIS — D68.51 FACTOR 5 LEIDEN MUTATION, HETEROZYGOUS (H): ICD-10-CM

## 2024-10-08 NOTE — PROGRESS NOTES
Hematology referral reviewed for [facility], see below.    Referral reason: referral received via fax from [facility] for elevated ferritin and Factor V, per below screen shot, referral flagged for Records Needed and patient advised via VM that once we have records, we will reach out for an appointment, left call back information

## 2024-10-09 ENCOUNTER — PRE VISIT (OUTPATIENT)
Dept: ONCOLOGY | Facility: CLINIC | Age: 64
End: 2024-10-09
Payer: COMMERCIAL

## 2024-10-09 NOTE — TELEPHONE ENCOUNTER
RECORDS STATUS - ALL OTHER DIAGNOSIS      RECORDS RECEIVED FROM: [facility]   NOTES STATUS DETAILS   OFFICE NOTE from referring provider External: [facility] Med 09/26/24: Dr. Sachin Dozier   OFFICE NOTE from medical oncologist     OFFICE NOTE from other specialist     DISCHARGE SUMMARY from hospital     DISCHARGE REPORT from the ER     OPERATIVE REPORT     MEDICATION LIST External: [facility]    LABS     PATHOLOGY REPORTS     ANYTHING RELATED TO DIAGNOSIS External: [facility] Med/[facility] Most recent 08/20/24   PATHOLOGY FEDEX TRACKING   Tracking #:   GENONOMIC TESTING     TYPE:     IMAGING (NEED IMAGES & REPORT)     CT SCANS     MRI     XRAYS     ULTRASOUND     PET     IMAGE DISC FEDEX TRACKING   Tracking #:

## 2024-10-10 ENCOUNTER — PATIENT OUTREACH (OUTPATIENT)
Dept: ONCOLOGY | Facility: CLINIC | Age: 64
End: 2024-10-10
Payer: COMMERCIAL

## 2024-10-10 NOTE — PROGRESS NOTES
Hematology referral reviewed for [facility], see below.    Referral reason: referral received from external primary care for elevated ferritin, mother with h/o hemochromatosis, negative work unit(s) thus far but ferritin per below, also with Factor V        Clinical question entered by referring provider or through order transcription: fax    Referral received via: Fax referral from [facility]    Current abnormal labs: Available in Media tab - referred by Dr Dozier    Plan: Triage instructions updated and sent to NPS for completion.

## 2024-11-17 NOTE — PROGRESS NOTES
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    Impression:  His history of recurrent deep venous thrombosis really involves distal lower extremity deep venous events; one in April, 2020 and another which is of questionable significance in July, 2024.  The latter event may be thrombus which is chronic and well-organized.  Neither event were truly proximal deep venous thromboses and therefore not at risk for thromboembolization.  Factor V Leiden gene mutations are really not that thrombogenic and we seldom make decisions about long-term anticoagulation based on that factor alone.  His future annual risk of major bleeding is about 1.5% with rivaroxaban.  The risk of continuing anticoagulation need to be weighed.  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  We want a rule out hepatic fibrosis from obesity and hepatic steatosis.  Other causes of hepatocellular inflammation need to be excluded.  Heterozygous factor V Leiden gene mutation  Obesity with no significant alcohol history.    Recommendation, plan, instructions:  1.  Hepatitis serologies, alpha-fetoprotein, ceruloplasmin, alpha-1 antitrypsin, cardiolipin antibody, beta-2 glycoprotein antibody.  2. Fibroscan; MR Bear to measure liver iron which may help us determine if therapeutic phlebotomy is indicated.  3. Follow-up Dr. Ruiz after test results back.  I am probably inclined to discontinue anticoagulation at this time and will also probably recommend a 20-30 mmHg pressure below-knee left compression stocking.  Will make these decisions after reviewed above tests and  discussed matters with the patient and his wife.  I also shared with him article from Up-to-date regarding the issues of surrounding indefinite anticoagulation for deep venous thrombosis.    Time with patient including review, documentation, history, examination, coordination of care and counseling was 60 minutes.    History of present illness:  Review of the patient's medical record furnished by his referring physician reveals a history of obesity, obstructive sleep apnea with inconsistent CPAP use, rivaroxaban therapy for apparent recurrent DVT and elevated serum ferritin's documented multiple times over 1000 within the last 2 years.    Hemochromatosis gene assay was negative as was protein C protein S, Antithrombin III and prothrombin gene mutations.  However he has a heterozygous positive factor V Leiden mutant gene.  Autoantibody panel and comprehensive metabolic panel were unremarkable including liver function studies.  His TSH was 1.9.  Hemoglobin A1c was 5.7%.  He had a March, 2024 colonoscopy which revealed a tubular adenoma without dysplasia.  A 8/29/2024 CT chest with contrast reviewed no acute findings with diffuse hepatic steatosis, tiny subcentimeter left aortic lymph nodes, a moderate amount of colonic stool and degenerative changes in the spine.    Past medical history:  No past medical history on file.      Family history:  I have reviewed this patient's family history and updated it with pertinent information if needed.  Family History   Problem Relation Age of Onset    Factor V Leiden deficiency Mother     Hypertension Sister     Factor V Leiden deficiency Brother            Medications:  Current Outpatient Medications   Medication Sig Dispense Refill    Ergocalciferol (VITAMIN D2) 50 MCG (2000 UT) TABS Take by mouth.      topiramate (TOPAMAX) 50 MG tablet Take 50 mg by mouth at bedtime.      vitamin C (ASCORBIC ACID) 1000 MG TABS Take 1,000 mg by mouth daily.      XARELTO ANTICOAGULANT 20 MG TABS  "tablet TAKE 1 TABLET BY MOUTH EVERY DAY WITH MEAL*      zinc 50 MG TABS Take by mouth.       No current facility-administered medications for this visit.       Allergies:  Allergies   Allergen Reactions    Tetracycline Rash       Review of systems:  Except as note above, the patient denies:  Headache, double vision, change in vision, hearing loss, tinnitus;  Dyspnea, chest pain, palpitations, pleurisy or hemoptysis;  Anorexia, nausea, vomiting, diarrhea, constipation, rectal bleeding bleeding, change in bowel habits;  Urinary frequency, burning or hematuria;  Fever, chills, sweats, change in appetite;  Bone or back pain; skin rash, joint swelling, new lumps;  Unexplained, bleeding or bruising, tingling numbness, change in gait;    Physical examination:  /75 (BP Location: Left arm, Patient Position: Chair, Cuff Size: Adult Large)   Pulse 60   Resp 16   Ht 1.791 m (5' 10.5\")   Wt 119.3 kg (263 lb)   SpO2 98%   BMI 37.20 kg/m      The patient is alert and oriented.   Throat and oral mucosa are normal-appearing.  Adenopathy is absent in the neck, axilla, groin and supraclavicular fossa;   Lungs are clear to auscultation and percussion without rales, wheezes or rubs; Heart rhythm is regular, heart sounds are without murmurs or gallops;   Abdomen is soft and flat without hepatosplenomegaly, masses, ascites or tenderness;   Extremities and skin reveal no unusual skin lesions, joint swelling or edema;    George Ruiz MD            "

## 2024-11-21 NOTE — TELEPHONE ENCOUNTER
RECORDS RECEIVED FROM: [facility]   NOTES STATUS DETAILS   OFFICE NOTE from referring provider External: [facility] Med 09/26/24: Dr. Sachin Dozier   OFFICE NOTE from medical oncologist       OFFICE NOTE from other specialist       DISCHARGE SUMMARY from hospital       DISCHARGE REPORT from the ER       OPERATIVE REPORT       MEDICATION LIST External: [facility]     LABS       PATHOLOGY REPORTS       ANYTHING RELATED TO DIAGNOSIS External: [facility] Med/[facility] Most recent 08/20/2

## 2024-11-26 ENCOUNTER — ONCOLOGY VISIT (OUTPATIENT)
Dept: ONCOLOGY | Facility: CLINIC | Age: 64
End: 2024-11-26
Attending: FAMILY MEDICINE
Payer: COMMERCIAL

## 2024-11-26 ENCOUNTER — PRE VISIT (OUTPATIENT)
Dept: ONCOLOGY | Facility: CLINIC | Age: 64
End: 2024-11-26
Payer: COMMERCIAL

## 2024-11-26 VITALS
BODY MASS INDEX: 36.82 KG/M2 | HEIGHT: 71 IN | DIASTOLIC BLOOD PRESSURE: 75 MMHG | OXYGEN SATURATION: 98 % | HEART RATE: 60 BPM | RESPIRATION RATE: 16 BRPM | SYSTOLIC BLOOD PRESSURE: 133 MMHG | WEIGHT: 263 LBS

## 2024-11-26 DIAGNOSIS — R79.89 ELEVATED FERRITIN: ICD-10-CM

## 2024-11-26 DIAGNOSIS — K76.0 HEPATIC STEATOSIS: Primary | ICD-10-CM

## 2024-11-26 DIAGNOSIS — Z11.59 ENCOUNTER FOR SCREENING FOR OTHER VIRAL DISEASES: ICD-10-CM

## 2024-11-26 DIAGNOSIS — K74.01 HEPATIC FIBROSIS, EARLY FIBROSIS: ICD-10-CM

## 2024-11-26 DIAGNOSIS — I82.532 CHRONIC DEEP VEIN THROMBOSIS (DVT) OF POPLITEAL VEIN OF LEFT LOWER EXTREMITY (H): ICD-10-CM

## 2024-11-26 PROCEDURE — 36415 COLL VENOUS BLD VENIPUNCTURE: CPT | Performed by: INTERNAL MEDICINE

## 2024-11-26 PROCEDURE — G0463 HOSPITAL OUTPT CLINIC VISIT: HCPCS | Performed by: INTERNAL MEDICINE

## 2024-11-26 PROCEDURE — 86146 BETA-2 GLYCOPROTEIN ANTIBODY: CPT | Performed by: INTERNAL MEDICINE

## 2024-11-26 PROCEDURE — 99205 OFFICE O/P NEW HI 60 MIN: CPT | Performed by: INTERNAL MEDICINE

## 2024-11-26 PROCEDURE — 87522 HEPATITIS C REVRS TRNSCRPJ: CPT | Performed by: INTERNAL MEDICINE

## 2024-11-26 PROCEDURE — 87340 HEPATITIS B SURFACE AG IA: CPT | Performed by: INTERNAL MEDICINE

## 2024-11-26 PROCEDURE — 82104 ALPHA-1-ANTITRYPSIN PHENO: CPT | Performed by: INTERNAL MEDICINE

## 2024-11-26 PROCEDURE — 86147 CARDIOLIPIN ANTIBODY EA IG: CPT | Performed by: INTERNAL MEDICINE

## 2024-11-26 PROCEDURE — 82105 ALPHA-FETOPROTEIN SERUM: CPT | Performed by: INTERNAL MEDICINE

## 2024-11-26 PROCEDURE — 86704 HEP B CORE ANTIBODY TOTAL: CPT | Performed by: INTERNAL MEDICINE

## 2024-11-26 PROCEDURE — 86803 HEPATITIS C AB TEST: CPT | Performed by: INTERNAL MEDICINE

## 2024-11-26 RX ORDER — MULTIVIT WITH MINERALS/LUTEIN
1000 TABLET ORAL DAILY
COMMUNITY

## 2024-11-26 RX ORDER — CHOLECALCIFEROL (VITAMIN D3) 125 MCG
TABLET ORAL
COMMUNITY

## 2024-11-26 RX ORDER — RIVAROXABAN 20 MG/1
TABLET, FILM COATED ORAL
COMMUNITY
Start: 2024-11-20

## 2024-11-26 RX ORDER — TOPIRAMATE 50 MG/1
50 TABLET, FILM COATED ORAL AT BEDTIME
COMMUNITY
Start: 2024-08-20

## 2024-11-26 RX ORDER — GINSENG 100 MG
CAPSULE ORAL
COMMUNITY

## 2024-11-26 ASSESSMENT — PAIN SCALES - GENERAL: PAINLEVEL_OUTOF10: NO PAIN (0)

## 2024-11-26 NOTE — LETTER
11/26/2024      Quentin Wheeler  [address] [address]      Dear Colleague,    Thank you for referring your patient, Quentin Wheeler, to the [facility]. Please see a copy of my visit note below.    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    Impression:  His history of recurrent deep venous thrombosis really involves distal lower extremity deep venous events; one in April, 2020 and another which is of questionable significance in July, 2024.  The latter event may be thrombus which is chronic and well-organized.  Neither event were truly proximal deep venous thromboses and therefore not at risk for thromboembolization.  Factor V Leiden gene mutations are really not that thrombogenic and we seldom make decisions about long-term anticoagulation based on that factor alone.  His future annual risk of major bleeding is about 1.5% with rivaroxaban.  The risk of continuing anticoagulation need to be weighed.  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  We want a rule out hepatic fibrosis from obesity and hepatic steatosis.  Other causes of hepatocellular inflammation need to be excluded.  Heterozygous factor V Leiden gene mutation  Obesity with no significant alcohol history.    Recommendation, plan, instructions:  1.  Hepatitis serologies, alpha-fetoprotein, ceruloplasmin, alpha-1 antitrypsin, cardiolipin antibody, beta-2 glycoprotein antibody.  2. Fibroscan; MR Sifuentes to measure liver iron which may help us determine if therapeutic phlebotomy is indicated.  3. Follow-up Dr. Ruiz  after test results back.  I am probably inclined to discontinue anticoagulation at this time and will also probably recommend a 20-30 mmHg pressure below-knee left compression stocking.  Will make these decisions after reviewed above tests and discussed matters with the patient and his wife.  I also shared with him article from Up-to-date regarding the issues of surrounding indefinite anticoagulation for deep venous thrombosis.    Time with patient including review, documentation, history, examination, coordination of care and counseling was 60 minutes.    History of present illness:  Review of the patient's medical record furnished by his referring physician reveals a history of obesity, obstructive sleep apnea with inconsistent CPAP use, rivaroxaban therapy for apparent recurrent DVT and elevated serum ferritin's documented multiple times over 1000 within the last 2 years.    Hemochromatosis gene assay was negative as was protein C protein S, Antithrombin III and prothrombin gene mutations.  However he has a heterozygous positive factor V Leiden mutant gene.  Autoantibody panel and comprehensive metabolic panel were unremarkable including liver function studies.  His TSH was 1.9.  Hemoglobin A1c was 5.7%.  He had a March, 2024 colonoscopy which revealed a tubular adenoma without dysplasia.  A 8/29/2024 CT chest with contrast reviewed no acute findings with diffuse hepatic steatosis, tiny subcentimeter left aortic lymph nodes, a moderate amount of colonic stool and degenerative changes in the spine.    Past medical history:  No past medical history on file.      Family history:  I have reviewed this patient's family history and updated it with pertinent information if needed.  Family History   Problem Relation Age of Onset     Factor V Leiden deficiency Mother      Hypertension Sister      Factor V Leiden deficiency Brother            Medications:  Current Outpatient Medications   Medication Sig Dispense Refill      "Ergocalciferol (VITAMIN D2) 50 MCG (2000 UT) TABS Take by mouth.       topiramate (TOPAMAX) 50 MG tablet Take 50 mg by mouth at bedtime.       vitamin C (ASCORBIC ACID) 1000 MG TABS Take 1,000 mg by mouth daily.       XARELTO ANTICOAGULANT 20 MG TABS tablet TAKE 1 TABLET BY MOUTH EVERY DAY WITH MEAL*       zinc 50 MG TABS Take by mouth.       No current facility-administered medications for this visit.       Allergies:  Allergies   Allergen Reactions     Tetracycline Rash       Review of systems:  Except as note above, the patient denies:  Headache, double vision, change in vision, hearing loss, tinnitus;  Dyspnea, chest pain, palpitations, pleurisy or hemoptysis;  Anorexia, nausea, vomiting, diarrhea, constipation, rectal bleeding bleeding, change in bowel habits;  Urinary frequency, burning or hematuria;  Fever, chills, sweats, change in appetite;  Bone or back pain; skin rash, joint swelling, new lumps;  Unexplained, bleeding or bruising, tingling numbness, change in gait;    Physical examination:  /75 (BP Location: Left arm, Patient Position: Chair, Cuff Size: Adult Large)   Pulse 60   Resp 16   Ht 1.791 m (5' 10.5\")   Wt 119.3 kg (263 lb)   SpO2 98%   BMI 37.20 kg/m      The patient is alert and oriented.   Throat and oral mucosa are normal-appearing.  Adenopathy is absent in the neck, axilla, groin and supraclavicular fossa;   Lungs are clear to auscultation and percussion without rales, wheezes or rubs; Heart rhythm is regular, heart sounds are without murmurs or gallops;   Abdomen is soft and flat without hepatosplenomegaly, masses, ascites or tenderness;   Extremities and skin reveal no unusual skin lesions, joint swelling or edema;    George Ruiz MD              Again, thank you for allowing me to participate in the care of your patient.        Sincerely,        George Ruiz MD  "

## 2024-11-26 NOTE — NURSING NOTE
"Oncology Rooming Note    November 26, 2024 3:40 PM   Quentin Wheeler is a 64 year old male who presents for:    Chief Complaint   Patient presents with    Hematology     Elevated Ferritin     Initial Vitals: /75 (BP Location: Left arm, Patient Position: Chair, Cuff Size: Adult Large)   Pulse 60   Resp 16   Ht 1.791 m (5' 10.5\")   Wt 119.3 kg (263 lb)   SpO2 98%   BMI 37.20 kg/m   Estimated body mass index is 37.2 kg/m  as calculated from the following:    Height as of this encounter: 1.791 m (5' 10.5\").    Weight as of this encounter: 119.3 kg (263 lb). Body surface area is 2.44 meters squared.  No Pain (0) Comment: Data Unavailable   No LMP for male patient.  Allergies reviewed: Yes  Medications reviewed: Yes    Medications: Medication refills not needed today.  Pharmacy name entered into Pryv: [id number] [address]    Frailty Screening:   Is the patient here for a new oncology consult visit in cancer care? 2. No      Clinical concerns: Raúl Yip CMA              "

## 2024-11-27 LAB
AFP SERPL-MCNC: 4.3 NG/ML
HBV CORE AB SERPL QL IA: NONREACTIVE
HBV SURFACE AG SERPL QL IA: NONREACTIVE
HCV AB SERPL QL IA: NONREACTIVE
HCV RNA SERPL NAA+PROBE-ACNC: NOT DETECTED IU/ML

## 2024-11-28 LAB
B2 GLYCOPROT1 IGG SERPL IA-ACNC: 0.8 U/ML
B2 GLYCOPROT1 IGM SERPL IA-ACNC: <2.4 U/ML
CARDIOLIPIN IGG SER IA-ACNC: <2 GPL-U/ML
CARDIOLIPIN IGG SER IA-ACNC: NEGATIVE
CARDIOLIPIN IGM SER IA-ACNC: <2 MPL-U/ML
CARDIOLIPIN IGM SER IA-ACNC: NEGATIVE

## 2024-12-01 LAB
A1AT PHENOTYP SERPL-IMP: NORMAL
A1AT SERPL-MCNC: 135 MG/DL

## 2024-12-02 LAB — CERULOPLASMIN SERPL-MCNC: 33 MG/DL (ref 20–60)

## 2024-12-14 NOTE — PROGRESS NOTES
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    Impression:  History of distal lower leg deep venous thrombosis and heterozygous factor V Leiden gene mutation  Left leg postphlebitic syndrome  Iron overload syndrome without hemochromatosis gene associated with increased liver stiffness and iron concentration    Recommendation, plan, instructions:  1.  Left below-knee compression stocking (20-30 mmHg)  2.  Discontinue rivaroxaban  3.  Monthly therapeutic phlebotomy  4.  Follow-up here in 6 months with CBC/differential and serum ferritin before appointment  5.  Efforts to lose weight and reduce alcohol intake would be extremely beneficial    Time with patient including review, documentation, history, examination, coordination of care and counseling was 20 minutes.    History of present illness:  Review of the patient's medical record furnished by his referring physician reveals a history of obesity, obstructive sleep apnea with inconsistent CPAP use, rivaroxaban therapy for apparent recurrent distal lower extremity DVT and elevated serum ferritin's documented multiple times over 1000 within the last 2 years.    His history of recurrent deep venous thrombosis really involves distal lower extremity deep venous events; one in April, 2020 and another which is of questionable significance in July, 2024.  The latter event may be thrombus which is chronic and well-organized.  Neither event were truly proximal deep venous thromboses and therefore not at risk for thromboembolization.  Factor V Leiden gene mutations are really not that thrombogenic and we seldom make decisions about long-term anticoagulation based on that factor alone.    Hemochromatosis gene assay  was negative as was protein C protein S, Antithrombin III and prothrombin gene mutations.  However he has a heterozygous positive factor V Leiden mutant gene.  Autoantibody panel and comprehensive metabolic panel were unremarkable including liver function studies.  His TSH was 1.9.  Hemoglobin A1c was 5.7%.  He had a March, 2024 colonoscopy which revealed a tubular adenoma without dysplasia.  A 8/29/2024 CT chest with contrast reviewed no acute findings with diffuse hepatic steatosis, tiny subcentimeter left aortic lymph nodes, a moderate amount of colonic stool and degenerative changes in the spine.    12/31/2024 MR Ferriscan revealed a slightly increased average liver iron concentration at 1.9 mg/grams dry tissue (0.17-1.8). FibroScan revealed increased liver stiffness of 1.4 to meters/second.  High probability of being normal was <1.3 m/sec..  Hepatitis B/C serologies, alpha-fetoprotein, ceruloplasmin, alpha-1 antitrypsin, cardiolipin antibody and beta-2 glycoprotein antibodies were negative or unremarkable.    Past medical history:  No past medical history on file.      Family history:  I have reviewed this patient's family history and updated it with pertinent information if needed.  Family History   Problem Relation Age of Onset    Factor V Leiden deficiency Mother     Hypertension Sister     Factor V Leiden deficiency Brother            Medications:  Current Outpatient Medications   Medication Sig Dispense Refill    Ergocalciferol (VITAMIN D2) 50 MCG (2000 UT) TABS Take by mouth.      topiramate (TOPAMAX) 50 MG tablet Take 50 mg by mouth at bedtime.      vitamin C (ASCORBIC ACID) 1000 MG TABS Take 1,000 mg by mouth daily.      XARELTO ANTICOAGULANT 20 MG TABS tablet TAKE 1 TABLET BY MOUTH EVERY DAY WITH MEAL*      zinc 50 MG TABS Take by mouth.       No current facility-administered medications for this visit.       Allergies:  Allergies   Allergen Reactions    Tetracycline Rash         Physical  "examination:  /76   Pulse 59   Resp 18   Ht 1.791 m (5' 10.51\")   Wt 118.9 kg (262 lb 3.2 oz)   SpO2 97%   BMI 37.08 kg/m      The patient is alert and oriented.       George Ruiz MD            "

## 2024-12-15 ENCOUNTER — HEALTH MAINTENANCE LETTER (OUTPATIENT)
Age: 64
End: 2024-12-15

## 2024-12-17 ENCOUNTER — PATIENT OUTREACH (OUTPATIENT)
Dept: ONCOLOGY | Facility: CLINIC | Age: 64
End: 2024-12-17
Payer: COMMERCIAL

## 2024-12-17 NOTE — PROGRESS NOTES
Received incoming voicemail from patient's wife who states that she has questions regarding scan approval as they got questionable documentation from insurance.  Re MR Sifuentes.    YOB: 1960   Member ID: [id number]   Group ID: [id number]   Reference Number: [id number]   Clinicals & Order for 11/26/2024 was sent auth request, CT done for 7/2024.     Notification received that scan was approved and they can proceed. Attempted to reach patient/wife via number on file but reached voicemail. VM left letting them know of the above information and left direct desk line if they would like to call back.    Abena Corea, RN, BSN  RN Care Coordinator - Oncology/Hematology  [facility]

## 2024-12-18 ENCOUNTER — TELEPHONE (OUTPATIENT)
Dept: ONCOLOGY | Facility: CLINIC | Age: 64
End: 2024-12-18
Payer: COMMERCIAL

## 2024-12-18 NOTE — PROGRESS NOTES
Spoke to wife and relayed information that we got scan approval and everything can remain as scheduled. She voiced understanding.

## 2024-12-31 ENCOUNTER — HOSPITAL ENCOUNTER (OUTPATIENT)
Dept: MRI IMAGING | Facility: CLINIC | Age: 64
Discharge: HOME | End: 2024-12-31
Attending: INTERNAL MEDICINE
Payer: COMMERCIAL

## 2024-12-31 ENCOUNTER — ANCILLARY PROCEDURE (OUTPATIENT)
Dept: ULTRASOUND IMAGING | Facility: CLINIC | Age: 64
End: 2024-12-31
Attending: INTERNAL MEDICINE
Payer: COMMERCIAL

## 2024-12-31 DIAGNOSIS — K76.0 HEPATIC STEATOSIS: ICD-10-CM

## 2024-12-31 DIAGNOSIS — R79.89 ELEVATED FERRITIN: ICD-10-CM

## 2024-12-31 PROCEDURE — 76981 USE PARENCHYMA: CPT | Performed by: RADIOLOGY

## 2024-12-31 PROCEDURE — 74181 MRI ABDOMEN W/O CONTRAST: CPT

## 2025-01-06 ENCOUNTER — ONCOLOGY VISIT (OUTPATIENT)
Dept: ONCOLOGY | Facility: CLINIC | Age: 65
End: 2025-01-06
Attending: INTERNAL MEDICINE
Payer: COMMERCIAL

## 2025-01-06 VITALS
WEIGHT: 262.2 LBS | BODY MASS INDEX: 36.71 KG/M2 | OXYGEN SATURATION: 97 % | DIASTOLIC BLOOD PRESSURE: 76 MMHG | HEIGHT: 71 IN | HEART RATE: 59 BPM | SYSTOLIC BLOOD PRESSURE: 135 MMHG | RESPIRATION RATE: 18 BRPM

## 2025-01-06 DIAGNOSIS — E83.19 IRON OVERLOAD SYNDROME: Primary | ICD-10-CM

## 2025-01-06 DIAGNOSIS — I87.009 POSTPHLEBITIC SYNDROME: ICD-10-CM

## 2025-01-06 PROCEDURE — G0463 HOSPITAL OUTPT CLINIC VISIT: HCPCS | Performed by: INTERNAL MEDICINE

## 2025-01-06 PROCEDURE — 99214 OFFICE O/P EST MOD 30 MIN: CPT | Performed by: INTERNAL MEDICINE

## 2025-01-06 ASSESSMENT — PAIN SCALES - GENERAL: PAINLEVEL_OUTOF10: NO PAIN (0)

## 2025-01-06 NOTE — LETTER
1/6/2025      Quentin Wheeler  [address] [facility] [address]      Dear Colleague,    Thank you for referring your patient, Quentin Wheeler, to the [facility]. Please see a copy of my visit note below.    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    Impression:  History of distal lower leg deep venous thrombosis and heterozygous factor V Leiden gene mutation  Left leg postphlebitic syndrome  Iron overload syndrome without hemochromatosis gene associated with increased liver stiffness and iron concentration    Recommendation, plan, instructions:  1.  Left below-knee compression stocking (20-30 mmHg)  2.  Discontinue rivaroxaban  3.  Monthly therapeutic phlebotomy  4.  Follow-up here in 6 months with CBC/differential and serum ferritin before appointment  5.  Efforts to lose weight and reduce alcohol intake would be extremely beneficial    Time with patient including review, documentation, history, examination, coordination of care and counseling was 20 minutes.    History of present illness:  Review of the patient's medical record furnished by his referring physician reveals a history of obesity, obstructive sleep apnea with inconsistent CPAP use, rivaroxaban therapy for apparent recurrent distal lower extremity DVT and elevated serum ferritin's documented multiple times over 1000 within the last 2 years.    His history of recurrent deep venous thrombosis really involves distal lower extremity deep venous events; one in April, 2020 and another which is of questionable significance in July, 2024.  The latter event may be thrombus which is chronic and well-organized.  Neither event were truly proximal deep  venous thromboses and therefore not at risk for thromboembolization.  Factor V Leiden gene mutations are really not that thrombogenic and we seldom make decisions about long-term anticoagulation based on that factor alone.    Hemochromatosis gene assay was negative as was protein C protein S, Antithrombin III and prothrombin gene mutations.  However he has a heterozygous positive factor V Leiden mutant gene.  Autoantibody panel and comprehensive metabolic panel were unremarkable including liver function studies.  His TSH was 1.9.  Hemoglobin A1c was 5.7%.  He had a March, 2024 colonoscopy which revealed a tubular adenoma without dysplasia.  A 8/29/2024 CT chest with contrast reviewed no acute findings with diffuse hepatic steatosis, tiny subcentimeter left aortic lymph nodes, a moderate amount of colonic stool and degenerative changes in the spine.    12/31/2024 MR Ferriscan revealed a slightly increased average liver iron concentration at 1.9 mg/grams dry tissue (0.17-1.8). FibroScan revealed increased liver stiffness of 1.4 to meters/second.  High probability of being normal was <1.3 m/sec..  Hepatitis B/C serologies, alpha-fetoprotein, ceruloplasmin, alpha-1 antitrypsin, cardiolipin antibody and beta-2 glycoprotein antibodies were negative or unremarkable.    Past medical history:  No past medical history on file.      Family history:  I have reviewed this patient's family history and updated it with pertinent information if needed.  Family History   Problem Relation Age of Onset     Factor V Leiden deficiency Mother      Hypertension Sister      Factor V Leiden deficiency Brother            Medications:  Current Outpatient Medications   Medication Sig Dispense Refill     Ergocalciferol (VITAMIN D2) 50 MCG (2000 UT) TABS Take by mouth.       topiramate (TOPAMAX) 50 MG tablet Take 50 mg by mouth at bedtime.       vitamin C (ASCORBIC ACID) 1000 MG TABS Take 1,000 mg by mouth daily.       XARELTO ANTICOAGULANT 20 MG  "TABS tablet TAKE 1 TABLET BY MOUTH EVERY DAY WITH MEAL*       zinc 50 MG TABS Take by mouth.       No current facility-administered medications for this visit.       Allergies:  Allergies   Allergen Reactions     Tetracycline Rash         Physical examination:  /76   Pulse 59   Resp 18   Ht 1.791 m (5' 10.51\")   Wt 118.9 kg (262 lb 3.2 oz)   SpO2 97%   BMI 37.08 kg/m      The patient is alert and oriented.       George Ruiz MD              Again, thank you for allowing me to participate in the care of your patient.        Sincerely,        George Ruiz MD    Electronically signed"

## 2025-01-06 NOTE — NURSING NOTE
"Oncology Rooming Note    January 6, 2025 2:58 PM   Quentin Wheeler is a 64 year old male who presents for:    Chief Complaint   Patient presents with    Oncology Clinic Visit     Follow up- results     Initial Vitals: /76   Pulse 59   Resp 18   Ht 1.791 m (5' 10.51\")   Wt 118.9 kg (262 lb 3.2 oz)   SpO2 97%   BMI 37.08 kg/m   Estimated body mass index is 37.08 kg/m  as calculated from the following:    Height as of this encounter: 1.791 m (5' 10.51\").    Weight as of this encounter: 118.9 kg (262 lb 3.2 oz). Body surface area is 2.43 meters squared.  No Pain (0) Comment: Data Unavailable   No LMP for male patient.  Allergies reviewed: Yes  Medications reviewed: Yes    Medications: Medication refills not needed today.  Pharmacy name entered into [facility]: [id number] [address] [facility]    Frailty Screening:   Is the patient here for a new oncology consult visit in cancer care? 2. No      Clinical concerns: results       Elvia Kolb LPN              "

## 2025-01-06 NOTE — PATIENT INSTRUCTIONS
1. Stop Xarelto  2. Left knee high compression stocking, 20-30 mm Hg compression  3. Monthly phlebotomy  4. Follow-up 6 months with MD with blood test before appointment

## 2025-01-14 ENCOUNTER — LAB (OUTPATIENT)
Dept: INFUSION THERAPY | Facility: CLINIC | Age: 65
End: 2025-01-14
Attending: INTERNAL MEDICINE
Payer: COMMERCIAL

## 2025-01-14 VITALS
DIASTOLIC BLOOD PRESSURE: 70 MMHG | SYSTOLIC BLOOD PRESSURE: 135 MMHG | OXYGEN SATURATION: 97 % | RESPIRATION RATE: 16 BRPM | TEMPERATURE: 97.7 F | HEART RATE: 61 BPM

## 2025-01-14 DIAGNOSIS — E83.19 IRON OVERLOAD SYNDROME: Primary | ICD-10-CM

## 2025-01-14 LAB
FERRITIN SERPL-MCNC: 1200 NG/ML (ref 31–409)
HCT VFR BLD AUTO: 50.9 % (ref 40–53)
HGB BLD-MCNC: 17 G/DL (ref 13.3–17.7)

## 2025-01-14 PROCEDURE — 36415 COLL VENOUS BLD VENIPUNCTURE: CPT | Performed by: INTERNAL MEDICINE

## 2025-01-14 PROCEDURE — 99207 PR NO CHARGE LOS: CPT

## 2025-01-14 PROCEDURE — 99195 PHLEBOTOMY: CPT

## 2025-01-14 PROCEDURE — 85018 HEMOGLOBIN: CPT | Performed by: INTERNAL MEDICINE

## 2025-01-14 PROCEDURE — 82728 ASSAY OF FERRITIN: CPT | Performed by: INTERNAL MEDICINE

## 2025-01-14 RX ORDER — HEPARIN SODIUM,PORCINE 10 UNIT/ML
5-20 VIAL (ML) INTRAVENOUS DAILY PRN
OUTPATIENT
Start: 2025-02-05

## 2025-01-14 RX ORDER — HEPARIN SODIUM (PORCINE) LOCK FLUSH IV SOLN 100 UNIT/ML 100 UNIT/ML
5 SOLUTION INTRAVENOUS
OUTPATIENT
Start: 2025-02-05

## 2025-01-14 NOTE — PROGRESS NOTES
"Prior to scheduled phlebotomy verified patient identity using patient's name and date of birth.  Lab(s) verified: HGB 17.0, hematocrit 50.9, Ferritin 1200.    Peripheral Access:  Accessed: Right Wrist with 20ga autoguard instyte catheter without difficulty.  Positive blood return.  No redness, edema or complaints of discomfort.  Phlebotomy started at 1020, ended at 1105. Pt tolerated phlebotomy, 500 mls of blood removed via gravity using whole blood collection bag and scale per MD's order.  1000 mls of oral fluid replaced.  Pt tolerated procedure without adverse reactions.  Pt denies headache, nausea or discomfort.  IV flushed with 10mls 0.9% Normal Saline after completion of procedure and discontinued per policy with pressure dressing applied.      Pre Vital Signs including O2 saturation documented in \"Vitals\"  Post Vital Signs stable, see flowsheet    Reviewed and provided discharge instructions regarding therapeutic phlebotomy.  Pt verbalized understanding.    Reviewed appointments and copy of schedule given to pt.  Return to clinic in: 02/11/2025.       Ambulation steady.  Pt alert and orientated upon discharge.          "

## 2025-02-11 ENCOUNTER — INFUSION THERAPY VISIT (OUTPATIENT)
Dept: INFUSION THERAPY | Facility: CLINIC | Age: 65
End: 2025-02-11
Attending: INTERNAL MEDICINE
Payer: COMMERCIAL

## 2025-02-11 VITALS
SYSTOLIC BLOOD PRESSURE: 149 MMHG | WEIGHT: 268 LBS | OXYGEN SATURATION: 98 % | BODY MASS INDEX: 37.9 KG/M2 | RESPIRATION RATE: 16 BRPM | DIASTOLIC BLOOD PRESSURE: 70 MMHG | TEMPERATURE: 97.7 F | HEART RATE: 64 BPM

## 2025-02-11 DIAGNOSIS — E83.19 IRON OVERLOAD SYNDROME: Primary | ICD-10-CM

## 2025-02-11 LAB
FERRITIN SERPL-MCNC: 866 NG/ML (ref 31–409)
HCT VFR BLD AUTO: 47.6 % (ref 40–53)
HGB BLD-MCNC: 15.6 G/DL (ref 13.3–17.7)

## 2025-02-11 PROCEDURE — 99207 PR NO CHARGE LOS: CPT

## 2025-02-11 PROCEDURE — 36415 COLL VENOUS BLD VENIPUNCTURE: CPT | Performed by: INTERNAL MEDICINE

## 2025-02-11 PROCEDURE — 99195 PHLEBOTOMY: CPT

## 2025-02-11 PROCEDURE — 85014 HEMATOCRIT: CPT | Performed by: INTERNAL MEDICINE

## 2025-02-11 PROCEDURE — 82728 ASSAY OF FERRITIN: CPT | Performed by: INTERNAL MEDICINE

## 2025-02-11 RX ORDER — HEPARIN SODIUM,PORCINE 10 UNIT/ML
5-20 VIAL (ML) INTRAVENOUS DAILY PRN
Status: CANCELLED | OUTPATIENT
Start: 2025-03-05

## 2025-02-11 RX ORDER — HEPARIN SODIUM (PORCINE) LOCK FLUSH IV SOLN 100 UNIT/ML 100 UNIT/ML
5 SOLUTION INTRAVENOUS
OUTPATIENT
Start: 2025-03-05

## 2025-02-11 RX ORDER — HEPARIN SODIUM (PORCINE) LOCK FLUSH IV SOLN 100 UNIT/ML 100 UNIT/ML
5 SOLUTION INTRAVENOUS
Status: CANCELLED | OUTPATIENT
Start: 2025-03-05

## 2025-02-11 RX ORDER — HEPARIN SODIUM,PORCINE 10 UNIT/ML
5-20 VIAL (ML) INTRAVENOUS DAILY PRN
OUTPATIENT
Start: 2025-03-05

## 2025-02-11 NOTE — PROGRESS NOTES
"Prior to scheduled phlebotomy verified patient identity using patient's name and date of birth.  Lab(s) verified: Hgb 15.6, Ferritin 866    Peripheral Access:  Accessed: left lower forearm with 20ga autoguard instyte catheter without difficulty.  Positive blood return.  No redness, edema or complaints of discomfort.  Pt tolerated phlebotomy, 500 mls of blood removed via gravity using whole blood collection bag and scale per MD's order.  120+ mls of oral fluid replaced.  Pt tolerated procedure without adverse reactions.  Pt denies headache, nausea or discomfort.  IV flushed with 10mls 0.9% Normal Saline after completion of procedure and discontinued per policy with pressure dressing applied.      Pre Vital Signs including O2 saturation documented in \"Vitals\"  Post Vital Signs WNL, see flowsheet      Reviewed and provided discharge instructions regarding therapeutic phlebotomy.  Pt verbalized understanding.    Reviewed appointments and copy of schedule given to pt.  Return to clinic in: 03/13/2025.       Ambulation steady.  Pt alert and orientated upon discharge.          "

## 2025-03-13 ENCOUNTER — LAB (OUTPATIENT)
Dept: INFUSION THERAPY | Facility: CLINIC | Age: 65
End: 2025-03-13
Attending: INTERNAL MEDICINE
Payer: COMMERCIAL

## 2025-03-13 VITALS
WEIGHT: 270.3 LBS | OXYGEN SATURATION: 95 % | DIASTOLIC BLOOD PRESSURE: 68 MMHG | SYSTOLIC BLOOD PRESSURE: 128 MMHG | HEART RATE: 68 BPM | BODY MASS INDEX: 38.22 KG/M2 | RESPIRATION RATE: 16 BRPM | TEMPERATURE: 98 F

## 2025-03-13 DIAGNOSIS — E83.19 IRON OVERLOAD SYNDROME: Primary | ICD-10-CM

## 2025-03-13 LAB
FERRITIN SERPL-MCNC: 672 NG/ML (ref 31–409)
HCT VFR BLD AUTO: 47.7 % (ref 40–53)
HGB BLD-MCNC: 15.8 G/DL (ref 13.3–17.7)
HOLD SPECIMEN: NORMAL

## 2025-03-13 PROCEDURE — 82728 ASSAY OF FERRITIN: CPT | Performed by: INTERNAL MEDICINE

## 2025-03-13 PROCEDURE — 85018 HEMOGLOBIN: CPT | Performed by: INTERNAL MEDICINE

## 2025-03-13 PROCEDURE — 99195 PHLEBOTOMY: CPT

## 2025-03-13 RX ORDER — HEPARIN SODIUM,PORCINE 10 UNIT/ML
5-20 VIAL (ML) INTRAVENOUS DAILY PRN
Status: CANCELLED | OUTPATIENT
Start: 2025-04-05

## 2025-03-13 RX ORDER — HEPARIN SODIUM,PORCINE 10 UNIT/ML
5-20 VIAL (ML) INTRAVENOUS DAILY PRN
OUTPATIENT
Start: 2025-04-05

## 2025-03-13 RX ORDER — HEPARIN SODIUM (PORCINE) LOCK FLUSH IV SOLN 100 UNIT/ML 100 UNIT/ML
5 SOLUTION INTRAVENOUS
OUTPATIENT
Start: 2025-04-05

## 2025-03-13 RX ORDER — HEPARIN SODIUM (PORCINE) LOCK FLUSH IV SOLN 100 UNIT/ML 100 UNIT/ML
5 SOLUTION INTRAVENOUS
Status: CANCELLED | OUTPATIENT
Start: 2025-04-05

## 2025-03-13 ASSESSMENT — PAIN SCALES - GENERAL: PAINLEVEL_OUTOF10: NO PAIN (0)

## 2025-03-13 NOTE — PROGRESS NOTES
"Prior to scheduled phlebotomy verified patient identity using patient's name and date of birth.  Lab(s) verified: Hgb 15.8 and Ferritin 672    Peripheral Access:  Accessed: left hand with 20ga autoguard instyte catheter without difficulty.  Positive blood return.  No redness, edema or complaints of discomfort.  Pt tolerated phlebotomy, 500 mls of blood removed via gravity using whole blood collection bag and scale per MD's order.  700 mls of oral fluid replaced.  Pt tolerated procedure without adverse reactions.  Pt denies headache, nausea or discomfort.  IV flushed with 10mls 0.9% Normal Saline after completion of procedure and discontinued per policy with pressure dressing applied.      Pre Vital Signs including O2 saturation documented in \"Vitals\"  Post Vital Signs VSS - see flowsheet for details    Reviewed and provided discharge instructions regarding therapeutic phlebotomy.  Pt verbalized understanding.    Reviewed appointments and copy of schedule given to pt.  Return to clinic in: 4/10/25 for next appt.       Ambulation steady.  Pt alert and orientated upon discharge.          "

## 2025-04-08 DIAGNOSIS — E83.19 IRON OVERLOAD SYNDROME: Primary | ICD-10-CM

## 2025-04-10 ENCOUNTER — INFUSION THERAPY VISIT (OUTPATIENT)
Dept: INFUSION THERAPY | Facility: CLINIC | Age: 65
End: 2025-04-10
Attending: INTERNAL MEDICINE
Payer: COMMERCIAL

## 2025-04-10 VITALS
OXYGEN SATURATION: 96 % | BODY MASS INDEX: 38.04 KG/M2 | SYSTOLIC BLOOD PRESSURE: 120 MMHG | RESPIRATION RATE: 16 BRPM | TEMPERATURE: 98 F | HEART RATE: 67 BPM | WEIGHT: 269 LBS | DIASTOLIC BLOOD PRESSURE: 65 MMHG

## 2025-04-10 DIAGNOSIS — E83.19 IRON OVERLOAD SYNDROME: Primary | ICD-10-CM

## 2025-04-10 LAB
FERRITIN SERPL-MCNC: 502 NG/ML (ref 31–409)
HCT VFR BLD AUTO: 46.1 % (ref 40–53)
HCT VFR BLD AUTO: 46.3 % (ref 40–53)
HGB BLD-MCNC: 15.7 G/DL (ref 13.3–17.7)
HGB BLD-MCNC: 15.7 G/DL (ref 13.3–17.7)
HOLD SPECIMEN: NORMAL

## 2025-04-10 PROCEDURE — 36415 COLL VENOUS BLD VENIPUNCTURE: CPT | Performed by: INTERNAL MEDICINE

## 2025-04-10 PROCEDURE — 82728 ASSAY OF FERRITIN: CPT | Performed by: INTERNAL MEDICINE

## 2025-04-10 PROCEDURE — 85014 HEMATOCRIT: CPT | Performed by: INTERNAL MEDICINE

## 2025-04-10 PROCEDURE — 99195 PHLEBOTOMY: CPT

## 2025-04-10 RX ORDER — HEPARIN SODIUM (PORCINE) LOCK FLUSH IV SOLN 100 UNIT/ML 100 UNIT/ML
5 SOLUTION INTRAVENOUS
OUTPATIENT
Start: 2025-05-05

## 2025-04-10 RX ORDER — HEPARIN SODIUM,PORCINE 10 UNIT/ML
5-20 VIAL (ML) INTRAVENOUS DAILY PRN
Status: CANCELLED | OUTPATIENT
Start: 2025-05-05

## 2025-04-10 RX ORDER — HEPARIN SODIUM (PORCINE) LOCK FLUSH IV SOLN 100 UNIT/ML 100 UNIT/ML
5 SOLUTION INTRAVENOUS
Status: CANCELLED | OUTPATIENT
Start: 2025-05-05

## 2025-04-10 RX ORDER — HEPARIN SODIUM,PORCINE 10 UNIT/ML
5-20 VIAL (ML) INTRAVENOUS DAILY PRN
OUTPATIENT
Start: 2025-05-05

## 2025-04-10 NOTE — PROGRESS NOTES
"Prior to scheduled phlebotomy verified patient identity using patient's name and date of birth.  Lab(s) verified: Hemoglobin >11, Ferritin >50    Patient with no medical complaints today. His lab parameter tubes had to be redrawn by writer due to lab never receiving them due to tube system malfunction; tubes could not be located. Hemoglobin resulted but second ferritin hemolyzed per . Charge RN apologized to patient, started IV, and sent new ferritin. She proceeded with starting the phlebotomy with hemoglobin >11 and last ferritin 672. Writer took over as primary.    Peripheral Access:  Accessed: left side of wrist with 20ga autoguard instyte catheter.  Positive blood return.  No redness, edema or complaints of discomfort.  Pt tolerated phlebotomy, 500 mls of blood removed via gravity using whole blood collection bag and scale per MD's order.  700 mls of oral fluid replaced.  Pt tolerated procedure without adverse reactions.  Pt denies headache, nausea, dizziness, or any other discomfort.  IV flushed with 10mls 0.9% Normal Saline after completion of procedure and discontinued per policy with pressure dressing applied.      Vital Signs including O2 saturation documented in \"Vitals\"    Reviewed and provided discharge instructions regarding therapeutic phlebotomy.  Pt verbalized understanding.    Reviewed appointments and copy of schedule given to pt.  Return to clinic: 5/8/25.       Ambulation steady.  Pt alert and orientated upon discharge.          "

## 2025-05-08 ENCOUNTER — INFUSION THERAPY VISIT (OUTPATIENT)
Dept: INFUSION THERAPY | Facility: CLINIC | Age: 65
End: 2025-05-08
Attending: INTERNAL MEDICINE
Payer: COMMERCIAL

## 2025-05-08 VITALS
RESPIRATION RATE: 16 BRPM | OXYGEN SATURATION: 96 % | WEIGHT: 272.5 LBS | TEMPERATURE: 97.6 F | SYSTOLIC BLOOD PRESSURE: 124 MMHG | HEART RATE: 56 BPM | BODY MASS INDEX: 38.53 KG/M2 | DIASTOLIC BLOOD PRESSURE: 64 MMHG

## 2025-05-08 DIAGNOSIS — E83.19 IRON OVERLOAD SYNDROME: Primary | ICD-10-CM

## 2025-05-08 LAB
FERRITIN SERPL-MCNC: 405 NG/ML (ref 31–409)
HCT VFR BLD AUTO: 45.7 % (ref 40–53)
HGB BLD-MCNC: 15.5 G/DL (ref 13.3–17.7)
HOLD SPECIMEN: NORMAL

## 2025-05-08 PROCEDURE — 82728 ASSAY OF FERRITIN: CPT | Performed by: INTERNAL MEDICINE

## 2025-05-08 PROCEDURE — 99195 PHLEBOTOMY: CPT

## 2025-05-08 PROCEDURE — 85018 HEMOGLOBIN: CPT | Performed by: INTERNAL MEDICINE

## 2025-05-08 RX ORDER — HEPARIN SODIUM (PORCINE) LOCK FLUSH IV SOLN 100 UNIT/ML 100 UNIT/ML
5 SOLUTION INTRAVENOUS
Status: CANCELLED | OUTPATIENT
Start: 2025-06-05

## 2025-05-08 RX ORDER — HEPARIN SODIUM (PORCINE) LOCK FLUSH IV SOLN 100 UNIT/ML 100 UNIT/ML
5 SOLUTION INTRAVENOUS
OUTPATIENT
Start: 2025-06-05

## 2025-05-08 RX ORDER — HEPARIN SODIUM,PORCINE 10 UNIT/ML
5-20 VIAL (ML) INTRAVENOUS DAILY PRN
OUTPATIENT
Start: 2025-06-05

## 2025-05-08 RX ORDER — HEPARIN SODIUM,PORCINE 10 UNIT/ML
5-20 VIAL (ML) INTRAVENOUS DAILY PRN
Status: CANCELLED | OUTPATIENT
Start: 2025-06-05

## 2025-05-08 NOTE — PROGRESS NOTES
"Prior to scheduled phlebotomy verified patient identity using patient's name and date of birth.  Lab(s) verified: hemoglobin: 15.5; ferritin 405    Peripheral Access:  Accessed: left wrist with 20ga autoguard instyte catheter without difficulty.  Positive blood return.  No redness, edema or complaints of discomfort.  Pt tolerated phlebotomy, 500 mls of blood removed via gravity using whole blood collection bag and scale per MD's order.  480 mls of oral fluid replaced.  Pt tolerated procedure without adverse reactions.  Pt denies headache, nausea or discomfort.  IV flushed with 10mls 0.9% Normal Saline after completion of procedure and discontinued per policy with pressure dressing applied.      Pre Vital Signs including O2 saturation documented in \"Vitals\"  Post Vital Signs see flowsheets    Reviewed and provided discharge instructions regarding therapeutic phlebotomy.  Pt verbalized understanding.    Reviewed appointments and copy of schedule given to pt.  Return to clinic in: in 1 month, next appointment scheduled for 6/5/25.       Ambulation steady.  Pt alert and orientated upon discharge.          "

## 2025-06-05 ENCOUNTER — LAB (OUTPATIENT)
Dept: INFUSION THERAPY | Facility: CLINIC | Age: 65
End: 2025-06-05
Attending: INTERNAL MEDICINE
Payer: COMMERCIAL

## 2025-06-05 VITALS
SYSTOLIC BLOOD PRESSURE: 135 MMHG | DIASTOLIC BLOOD PRESSURE: 65 MMHG | HEART RATE: 60 BPM | BODY MASS INDEX: 38.07 KG/M2 | RESPIRATION RATE: 12 BRPM | TEMPERATURE: 97.8 F | OXYGEN SATURATION: 97 % | WEIGHT: 269.2 LBS

## 2025-06-05 DIAGNOSIS — E83.19 IRON OVERLOAD SYNDROME: Primary | ICD-10-CM

## 2025-06-05 LAB
FERRITIN SERPL-MCNC: 344 NG/ML (ref 31–409)
HCT VFR BLD AUTO: 46 % (ref 40–53)
HGB BLD-MCNC: 15.2 G/DL (ref 13.3–17.7)
HOLD SPECIMEN: NORMAL
MCV RBC AUTO: 90 FL (ref 78–100)

## 2025-06-05 PROCEDURE — 85018 HEMOGLOBIN: CPT | Performed by: INTERNAL MEDICINE

## 2025-06-05 PROCEDURE — 99195 PHLEBOTOMY: CPT

## 2025-06-05 PROCEDURE — 82728 ASSAY OF FERRITIN: CPT

## 2025-06-05 RX ORDER — HEPARIN SODIUM (PORCINE) LOCK FLUSH IV SOLN 100 UNIT/ML 100 UNIT/ML
5 SOLUTION INTRAVENOUS
OUTPATIENT
Start: 2025-07-05

## 2025-06-05 RX ORDER — HEPARIN SODIUM (PORCINE) LOCK FLUSH IV SOLN 100 UNIT/ML 100 UNIT/ML
5 SOLUTION INTRAVENOUS
Status: CANCELLED | OUTPATIENT
Start: 2025-07-05

## 2025-06-05 RX ORDER — HEPARIN SODIUM,PORCINE 10 UNIT/ML
5-20 VIAL (ML) INTRAVENOUS DAILY PRN
OUTPATIENT
Start: 2025-07-05

## 2025-06-05 RX ORDER — HEPARIN SODIUM,PORCINE 10 UNIT/ML
5-20 VIAL (ML) INTRAVENOUS DAILY PRN
Status: CANCELLED | OUTPATIENT
Start: 2025-07-05

## 2025-06-05 NOTE — PROGRESS NOTES
"Prior to scheduled phlebotomy verified patient identity using patient's name and date of birth.  Lab(s) verified: Hemoglobin > 11 and Ferritin > 50    Peripheral Access:  Accessed: left wrist with 20ga autoguard instyte catheter without difficulty.  Positive blood return.  No redness, edema or complaints of discomfort.  Pt tolerated phlebotomy, 260 mls of blood removed via gravity using whole blood collection bag. Then blood stopped flowing after flushing, repositioning arm and warm packs. Thus, 240 ml of blood was manually withdrawn in syringes .  480 mls of oral fluid replaced.  Pt tolerated procedure without adverse reactions.  Pt denies headache, nausea or discomfort.  IV discontinued per policy with pressure dressing applied.      Pre Vital Signs including O2 saturation documented in \"Vitals\"    Reviewed and provided discharge instructions regarding therapeutic phlebotomy.  Pt verbalized understanding.    Reviewed appointments and copy of schedule given to pt.  Return to clinic in: 7/9/25.       Ambulation steady.  Pt alert and orientated upon discharge.          "

## 2025-07-03 ENCOUNTER — DOCUMENTATION ONLY (OUTPATIENT)
Dept: ONCOLOGY | Facility: CLINIC | Age: 65
End: 2025-07-03
Payer: COMMERCIAL

## 2025-07-03 DIAGNOSIS — R79.89 ELEVATED FERRITIN: ICD-10-CM

## 2025-07-03 DIAGNOSIS — E83.19 IRON OVERLOAD SYNDROME: Primary | ICD-10-CM

## 2025-07-03 DIAGNOSIS — K76.0 HEPATIC STEATOSIS: ICD-10-CM

## 2025-07-03 NOTE — PROGRESS NOTES
Quentin Wheeler has an upcoming lab appointment:    Future Appointments   Date Time Provider Department Center   7/9/2025 10:30 AM [facility] PIV LAB [facility]   7/9/2025 11:00 AM Frederic Avila MD [facility]   7/9/2025 11:30 AM [facility] BAY 3 INFUS[facility]   8/12/2025  7:30 AM [facility] CANCER PIV LAB [facility]   8/12/2025  8:00 AM [facility] BAY 1 INFUSION [facility]     Patient is scheduled for the following lab(s): Pt is requesting labs on 7/9/25 with orders in chart from a provider that dose not work here anymore.    Thank you, Mary

## 2025-07-09 ENCOUNTER — ONCOLOGY VISIT (OUTPATIENT)
Dept: ONCOLOGY | Facility: CLINIC | Age: 65
End: 2025-07-09
Attending: INTERNAL MEDICINE
Payer: COMMERCIAL

## 2025-07-09 ENCOUNTER — LAB (OUTPATIENT)
Dept: INFUSION THERAPY | Facility: CLINIC | Age: 65
End: 2025-07-09
Attending: INTERNAL MEDICINE
Payer: COMMERCIAL

## 2025-07-09 ENCOUNTER — HOSPITAL ENCOUNTER (INPATIENT)
Facility: CLINIC | Age: 65
End: 2025-07-09
Attending: EMERGENCY MEDICINE | Admitting: STUDENT IN AN ORGANIZED HEALTH CARE EDUCATION/TRAINING PROGRAM
Payer: COMMERCIAL

## 2025-07-09 VITALS
WEIGHT: 267 LBS | SYSTOLIC BLOOD PRESSURE: 137 MMHG | BODY MASS INDEX: 37.76 KG/M2 | OXYGEN SATURATION: 98 % | RESPIRATION RATE: 16 BRPM | DIASTOLIC BLOOD PRESSURE: 60 MMHG | HEART RATE: 62 BPM

## 2025-07-09 VITALS
DIASTOLIC BLOOD PRESSURE: 57 MMHG | OXYGEN SATURATION: 97 % | RESPIRATION RATE: 16 BRPM | HEART RATE: 32 BPM | SYSTOLIC BLOOD PRESSURE: 118 MMHG

## 2025-07-09 DIAGNOSIS — E83.19 IRON OVERLOAD SYNDROME: ICD-10-CM

## 2025-07-09 DIAGNOSIS — E83.19 IRON OVERLOAD SYNDROME: Primary | ICD-10-CM

## 2025-07-09 DIAGNOSIS — R00.1 BRADYCARDIA: ICD-10-CM

## 2025-07-09 DIAGNOSIS — Z86.718 PERSONAL HISTORY OF DVT (DEEP VEIN THROMBOSIS): Primary | ICD-10-CM

## 2025-07-09 DIAGNOSIS — R79.89 ELEVATED FERRITIN: ICD-10-CM

## 2025-07-09 DIAGNOSIS — K76.0 HEPATIC STEATOSIS: ICD-10-CM

## 2025-07-09 DIAGNOSIS — I44.30 AV BLOCK: ICD-10-CM

## 2025-07-09 LAB
ALBUMIN SERPL BCG-MCNC: 4.6 G/DL (ref 3.5–5.2)
ALP SERPL-CCNC: 58 U/L (ref 40–150)
ALT SERPL W P-5'-P-CCNC: 20 U/L (ref 0–70)
ANION GAP SERPL CALCULATED.3IONS-SCNC: 10 MMOL/L (ref 7–15)
ANION GAP SERPL CALCULATED.3IONS-SCNC: 10 MMOL/L (ref 7–15)
AST SERPL W P-5'-P-CCNC: 22 U/L (ref 0–45)
ATRIAL RATE - MUSE: 60 BPM
BASOPHILS # BLD AUTO: 0 10E3/UL (ref 0–0.2)
BASOPHILS # BLD AUTO: 0.1 10E3/UL (ref 0–0.2)
BASOPHILS NFR BLD AUTO: 1 %
BASOPHILS NFR BLD AUTO: 1 %
BILIRUB SERPL-MCNC: 0.5 MG/DL
BUN SERPL-MCNC: 18.8 MG/DL (ref 8–23)
BUN SERPL-MCNC: 20.6 MG/DL (ref 8–23)
CALCIUM SERPL-MCNC: 8.5 MG/DL (ref 8.8–10.4)
CALCIUM SERPL-MCNC: 9.5 MG/DL (ref 8.8–10.4)
CHLORIDE SERPL-SCNC: 104 MMOL/L (ref 98–107)
CHLORIDE SERPL-SCNC: 105 MMOL/L (ref 98–107)
CREAT SERPL-MCNC: 0.8 MG/DL (ref 0.67–1.17)
CREAT SERPL-MCNC: 0.95 MG/DL (ref 0.67–1.17)
DIASTOLIC BLOOD PRESSURE - MUSE: NORMAL MMHG
EGFRCR SERPLBLD CKD-EPI 2021: 88 ML/MIN/1.73M2
EGFRCR SERPLBLD CKD-EPI 2021: >90 ML/MIN/1.73M2
EOSINOPHIL # BLD AUTO: 0.2 10E3/UL (ref 0–0.7)
EOSINOPHIL # BLD AUTO: 0.2 10E3/UL (ref 0–0.7)
EOSINOPHIL NFR BLD AUTO: 2 %
EOSINOPHIL NFR BLD AUTO: 3 %
ERYTHROCYTE [DISTWIDTH] IN BLOOD BY AUTOMATED COUNT: 12.4 % (ref 10–15)
ERYTHROCYTE [DISTWIDTH] IN BLOOD BY AUTOMATED COUNT: 12.4 % (ref 10–15)
FERRITIN SERPL-MCNC: 268 NG/ML (ref 31–409)
GLUCOSE SERPL-MCNC: 105 MG/DL (ref 70–99)
GLUCOSE SERPL-MCNC: 93 MG/DL (ref 70–99)
HCO3 SERPL-SCNC: 24 MMOL/L (ref 22–29)
HCO3 SERPL-SCNC: 26 MMOL/L (ref 22–29)
HCT VFR BLD AUTO: 45.1 % (ref 40–53)
HCT VFR BLD AUTO: 46.8 % (ref 40–53)
HGB BLD-MCNC: 14.7 G/DL (ref 13.3–17.7)
HGB BLD-MCNC: 15.6 G/DL (ref 13.3–17.7)
IMM GRANULOCYTES # BLD: 0 10E3/UL
IMM GRANULOCYTES # BLD: 0 10E3/UL
IMM GRANULOCYTES NFR BLD: 0 %
IMM GRANULOCYTES NFR BLD: 0 %
INTERPRETATION ECG - MUSE: NORMAL
IRON BINDING CAPACITY (ROCHE): 299 UG/DL (ref 240–430)
IRON SATN MFR SERPL: 23 % (ref 15–46)
IRON SERPL-MCNC: 70 UG/DL (ref 61–157)
LYMPHOCYTES # BLD AUTO: 2.1 10E3/UL (ref 0.8–5.3)
LYMPHOCYTES # BLD AUTO: 2.3 10E3/UL (ref 0.8–5.3)
LYMPHOCYTES NFR BLD AUTO: 28 %
LYMPHOCYTES NFR BLD AUTO: 29 %
MAGNESIUM SERPL-MCNC: 2.2 MG/DL (ref 1.7–2.3)
MCH RBC QN AUTO: 29.2 PG (ref 26.5–33)
MCH RBC QN AUTO: 29.3 PG (ref 26.5–33)
MCHC RBC AUTO-ENTMCNC: 32.6 G/DL (ref 31.5–36.5)
MCHC RBC AUTO-ENTMCNC: 33.3 G/DL (ref 31.5–36.5)
MCV RBC AUTO: 88 FL (ref 78–100)
MCV RBC AUTO: 90 FL (ref 78–100)
MONOCYTES # BLD AUTO: 0.6 10E3/UL (ref 0–1.3)
MONOCYTES # BLD AUTO: 0.6 10E3/UL (ref 0–1.3)
MONOCYTES NFR BLD AUTO: 7 %
MONOCYTES NFR BLD AUTO: 8 %
NEUTROPHILS # BLD AUTO: 4.5 10E3/UL (ref 1.6–8.3)
NEUTROPHILS # BLD AUTO: 5.1 10E3/UL (ref 1.6–8.3)
NEUTROPHILS NFR BLD AUTO: 60 %
NEUTROPHILS NFR BLD AUTO: 61 %
NRBC # BLD AUTO: 0 10E3/UL
NRBC # BLD AUTO: 0 10E3/UL
NRBC BLD AUTO-RTO: 0 /100
NRBC BLD AUTO-RTO: 0 /100
P AXIS - MUSE: 48 DEGREES
PLATELET # BLD AUTO: 228 10E3/UL (ref 150–450)
PLATELET # BLD AUTO: 231 10E3/UL (ref 150–450)
POTASSIUM SERPL-SCNC: 4.2 MMOL/L (ref 3.4–5.3)
POTASSIUM SERPL-SCNC: 4.8 MMOL/L (ref 3.4–5.3)
PR INTERVAL - MUSE: NORMAL MS
PROT SERPL-MCNC: 7.1 G/DL (ref 6.4–8.3)
QRS DURATION - MUSE: 100 MS
QT - MUSE: 474 MS
QTC - MUSE: 361 MS
R AXIS - MUSE: -17 DEGREES
RBC # BLD AUTO: 5.04 10E6/UL (ref 4.4–5.9)
RBC # BLD AUTO: 5.32 10E6/UL (ref 4.4–5.9)
SODIUM SERPL-SCNC: 139 MMOL/L (ref 135–145)
SODIUM SERPL-SCNC: 140 MMOL/L (ref 135–145)
SYSTOLIC BLOOD PRESSURE - MUSE: NORMAL MMHG
T AXIS - MUSE: 16 DEGREES
TSH SERPL DL<=0.005 MIU/L-ACNC: 1.36 UIU/ML (ref 0.3–4.2)
VENTRICULAR RATE- MUSE: 35 BPM
WBC # BLD AUTO: 7.5 10E3/UL (ref 4–11)
WBC # BLD AUTO: 8.3 10E3/UL (ref 4–11)

## 2025-07-09 PROCEDURE — 99223 1ST HOSP IP/OBS HIGH 75: CPT | Performed by: HOSPITALIST

## 2025-07-09 PROCEDURE — 99223 1ST HOSP IP/OBS HIGH 75: CPT | Performed by: INTERNAL MEDICINE

## 2025-07-09 PROCEDURE — 96360 HYDRATION IV INFUSION INIT: CPT

## 2025-07-09 PROCEDURE — 83540 ASSAY OF IRON: CPT

## 2025-07-09 PROCEDURE — 93005 ELECTROCARDIOGRAM TRACING: CPT

## 2025-07-09 PROCEDURE — 96361 HYDRATE IV INFUSION ADD-ON: CPT

## 2025-07-09 PROCEDURE — 85004 AUTOMATED DIFF WBC COUNT: CPT

## 2025-07-09 PROCEDURE — 85004 AUTOMATED DIFF WBC COUNT: CPT | Performed by: EMERGENCY MEDICINE

## 2025-07-09 PROCEDURE — 84443 ASSAY THYROID STIM HORMONE: CPT | Performed by: EMERGENCY MEDICINE

## 2025-07-09 PROCEDURE — 36415 COLL VENOUS BLD VENIPUNCTURE: CPT

## 2025-07-09 PROCEDURE — 36415 COLL VENOUS BLD VENIPUNCTURE: CPT | Performed by: EMERGENCY MEDICINE

## 2025-07-09 PROCEDURE — 99207 PR NO CHARGE LOS: CPT

## 2025-07-09 PROCEDURE — 82310 ASSAY OF CALCIUM: CPT

## 2025-07-09 PROCEDURE — 99285 EMERGENCY DEPT VISIT HI MDM: CPT | Performed by: EMERGENCY MEDICINE

## 2025-07-09 PROCEDURE — 258N000003 HC RX IP 258 OP 636: Performed by: HOSPITALIST

## 2025-07-09 PROCEDURE — G0378 HOSPITAL OBSERVATION PER HR: HCPCS

## 2025-07-09 PROCEDURE — G0463 HOSPITAL OUTPT CLINIC VISIT: HCPCS | Performed by: INTERNAL MEDICINE

## 2025-07-09 PROCEDURE — 82728 ASSAY OF FERRITIN: CPT

## 2025-07-09 PROCEDURE — 83735 ASSAY OF MAGNESIUM: CPT | Performed by: EMERGENCY MEDICINE

## 2025-07-09 PROCEDURE — 99195 PHLEBOTOMY: CPT

## 2025-07-09 RX ORDER — AMOXICILLIN 250 MG
2 CAPSULE ORAL 2 TIMES DAILY PRN
Status: DISCONTINUED | OUTPATIENT
Start: 2025-07-09 | End: 2025-07-11 | Stop reason: HOSPADM

## 2025-07-09 RX ORDER — ONDANSETRON 4 MG/1
4 TABLET, ORALLY DISINTEGRATING ORAL EVERY 6 HOURS PRN
Status: DISCONTINUED | OUTPATIENT
Start: 2025-07-09 | End: 2025-07-11 | Stop reason: HOSPADM

## 2025-07-09 RX ORDER — ONDANSETRON 2 MG/ML
4 INJECTION INTRAMUSCULAR; INTRAVENOUS EVERY 6 HOURS PRN
Status: DISCONTINUED | OUTPATIENT
Start: 2025-07-09 | End: 2025-07-11 | Stop reason: HOSPADM

## 2025-07-09 RX ORDER — PROCHLORPERAZINE MALEATE 5 MG/1
5 TABLET ORAL EVERY 6 HOURS PRN
Status: DISCONTINUED | OUTPATIENT
Start: 2025-07-09 | End: 2025-07-11 | Stop reason: HOSPADM

## 2025-07-09 RX ORDER — AMOXICILLIN 250 MG
1 CAPSULE ORAL 2 TIMES DAILY PRN
Status: DISCONTINUED | OUTPATIENT
Start: 2025-07-09 | End: 2025-07-11 | Stop reason: HOSPADM

## 2025-07-09 RX ORDER — SODIUM CHLORIDE 9 MG/ML
INJECTION, SOLUTION INTRAVENOUS CONTINUOUS
Status: DISCONTINUED | OUTPATIENT
Start: 2025-07-09 | End: 2025-07-11 | Stop reason: HOSPADM

## 2025-07-09 RX ORDER — ACETAMINOPHEN 650 MG/1
650 SUPPOSITORY RECTAL EVERY 4 HOURS PRN
Status: DISCONTINUED | OUTPATIENT
Start: 2025-07-09 | End: 2025-07-11 | Stop reason: HOSPADM

## 2025-07-09 RX ORDER — HEPARIN SODIUM,PORCINE 10 UNIT/ML
5-20 VIAL (ML) INTRAVENOUS DAILY PRN
OUTPATIENT
Start: 2025-08-05

## 2025-07-09 RX ORDER — HEPARIN SODIUM (PORCINE) LOCK FLUSH IV SOLN 100 UNIT/ML 100 UNIT/ML
5 SOLUTION INTRAVENOUS
OUTPATIENT
Start: 2025-08-05

## 2025-07-09 RX ORDER — ACETAMINOPHEN 325 MG/1
650 TABLET ORAL EVERY 4 HOURS PRN
Status: DISCONTINUED | OUTPATIENT
Start: 2025-07-09 | End: 2025-07-11 | Stop reason: HOSPADM

## 2025-07-09 RX ADMIN — SODIUM CHLORIDE: 0.9 INJECTION, SOLUTION INTRAVENOUS at 16:59

## 2025-07-09 ASSESSMENT — ACTIVITIES OF DAILY LIVING (ADL)
ADLS_ACUITY_SCORE: 42
ADLS_ACUITY_SCORE: 41
ADLS_ACUITY_SCORE: 42
ADLS_ACUITY_SCORE: 42
ADLS_ACUITY_SCORE: 41

## 2025-07-09 ASSESSMENT — COLUMBIA-SUICIDE SEVERITY RATING SCALE - C-SSRS
2. HAVE YOU ACTUALLY HAD ANY THOUGHTS OF KILLING YOURSELF IN THE PAST MONTH?: NO
1. IN THE PAST MONTH, HAVE YOU WISHED YOU WERE DEAD OR WISHED YOU COULD GO TO SLEEP AND NOT WAKE UP?: NO
6. HAVE YOU EVER DONE ANYTHING, STARTED TO DO ANYTHING, OR PREPARED TO DO ANYTHING TO END YOUR LIFE?: NO

## 2025-07-09 ASSESSMENT — PAIN SCALES - GENERAL: PAINLEVEL_OUTOF10: NO PAIN (0)

## 2025-07-09 NOTE — PROGRESS NOTES
"Prior to scheduled phlebotomy verified patient identity using patient's name and date of birth.  Lab(s) verified:  Hbg 15.6  Hematocrit 46.8  Ferritin 268    Peripheral Access:  Accessed: right hand with 20ga autoguard instyte catheter without difficulty.  Positive blood return.  No redness, edema or complaints of discomfort.  Pt tolerated phlebotomy, 420 mls of blood removed via gravity using whole blood collection bag and scale per MD's order. 80 mLs removed by syringe.  600 mls of oral fluid replaced.  Pt tolerated procedure without adverse reactions.  Pt denies headache, nausea or discomfort.  IV flushed with 10mls 0.9% Normal Saline after completion of procedure and discontinued per policy with pressure dressing applied.      Pre Vital Signs including O2 saturation documented in \"Vitals\"  Post Vital Signs 118/57, HR 32, O2 Sat 97%. Pt denies chest pain, nausea, dizziness or lightheadedness. Skin is pink and dry.  Dr Avila notified of low heart rate, EKG ordered and completed. Dr Avila present to evaluate the EKG and see the patient. EKG appeared to show a AVB 2nd degree type 2. Dr Avila recommended that the patient be transported by EMS to the hospital so he can't be monitored. EMS called.      Reviewed appointments and copy of schedule given to pt.  Return to clinic in: 08/12/2025.       IV left in place for EMS. Transported by stretcher by EMS for evaluation at ED. Wife present and is driving separately.         "

## 2025-07-09 NOTE — ED NOTES
Bed: ED15  Expected date:   Expected time:   Means of arrival:   Comments:   65 M bradycardia 2nd deg type 2 rate 30 asymptomatic 110/60 eta 1320

## 2025-07-09 NOTE — PHARMACY-ADMISSION MEDICATION HISTORY
Pharmacist Admission Medication History    Admission medication history is complete. The information provided in this note is only as accurate as the sources available at the time of the update.    Information Source(s): Patient via in-person    Pertinent Information: none    Changes made to PTA medication list:  Added: Metamucil  Deleted: topiramate  Changed: added frequency to zinc and vitamin D    Allergies reviewed with patient and updates made in EHR: yes    Medication History Completed By: Lisa Lawton [facility] 7/9/2025 3:05 PM    PTA Med List   Medication Sig Last Dose/Taking    Ergocalciferol (VITAMIN D2) 50 MCG (2000 UT) TABS Take 2,000 Units by mouth daily. Past Week    psyllium (METAMUCIL/KONSYL) capsule Take 1 capsule by mouth daily. Past Week    vitamin C (ASCORBIC ACID) 1000 MG TABS Take 1,000 mg by mouth daily. Past Week    zinc 50 MG TABS Take 50 mg by mouth daily. Past Week

## 2025-07-09 NOTE — PROGRESS NOTES
Patient was sent from clinic to ED due to bradycardia, found to be in high degree AV block type one, but asymptomatic, admitted for possible Pace maker implantation tomorrow, Zoll patches in place, A/O x 4, denies pain, bed rest with Bathroom privileges, tele SB with 2nd AVB type 1.

## 2025-07-09 NOTE — CONSULTS
[facility]    Cardiology Consultation     Date of Admission:  7/9/2025    Assessment & Plan   Quentin Wheeler is a 65 year old male who was admitted on 7/9/2025.    1.  High degree AV block alternating with Mobitz type I AV block-asymptomatic  2.  Hemochromatosis    Recommendation  1.  Apply pacing pads to the chest.  Keep atropine at bedside.  Perform echocardiogram.  Avoid AV feliciano blocking agents.  2.  Consult electrophysiology for further recommendations     High complexity     Roosevelt Vazquez MD, MD    Primary Care Physician   Sachin Dozier    Reason for Consult   Reason for consult: I was asked to evaluate this patient for bradycardia.    History of Present Illness   Quentin Wheeler is a 65 year old male with past medical history of hemochromatosis but no cardiac history who presents to the emergency department after being referred from hematology clinic for bradycardia.  In the emergency department he had an EKG which showed Mobitz type I AV block alternating with high degree [2:1] AV block.  Patient remains completely asymptomatic.  No prior history of syncope or near syncope.  He is able to exert without any difficulty.    Past Medical History   Hemochromatosis    Past Surgical History   Past Surgical History:   Procedure Laterality Date    APPENDECTOMY      HERNIA REPAIR       Prior to Admission Medications   Prior to Admission Medications   Prescriptions Last Dose Informant Patient Reported? Taking?   Ergocalciferol (VITAMIN D2) 50 MCG (2000 UT) TABS Past Week Self Yes Yes   Sig: Take 2,000 Units by mouth daily.   psyllium (METAMUCIL/KONSYL) capsule Past Week Self Yes Yes   Sig: Take 1 capsule by mouth daily.   vitamin C (ASCORBIC ACID) 1000 MG TABS Past Week Self Yes Yes   Sig: Take 1,000 mg by mouth daily.   zinc 50 MG TABS Past Week Self Yes Yes   Sig: Take 50 mg by mouth daily.      Facility-Administered Medications: None     No current facility-administered medications  "for this encounter.     Current Outpatient Medications   Medication Sig Dispense Refill    Ergocalciferol (VITAMIN D2) 50 MCG (2000 UT) TABS Take 2,000 Units by mouth daily.      psyllium (METAMUCIL/KONSYL) capsule Take 1 capsule by mouth daily.      vitamin C (ASCORBIC ACID) 1000 MG TABS Take 1,000 mg by mouth daily.      zinc 50 MG TABS Take 50 mg by mouth daily.       No current facility-administered medications for this encounter.     Current Outpatient Medications   Medication Sig Dispense Refill    Ergocalciferol (VITAMIN D2) 50 MCG (2000 UT) TABS Take 2,000 Units by mouth daily.      psyllium (METAMUCIL/KONSYL) capsule Take 1 capsule by mouth daily.      vitamin C (ASCORBIC ACID) 1000 MG TABS Take 1,000 mg by mouth daily.      zinc 50 MG TABS Take 50 mg by mouth daily.       Allergies   Allergies   Allergen Reactions    Tetracycline Rash       Social History    reports that he has never smoked. He has never used smokeless tobacco. He reports current alcohol use. He reports that he does not use drugs.      Family History   I have reviewed this patient's family history and updated it with pertinent information if needed.  Family History   Problem Relation Age of Onset    Factor V Leiden deficiency Mother     Hypertension Sister     Factor V Leiden deficiency Brother           Review of Systems   A comprehensive review of system was performed and is negative other than that noted in the HPI or here.     Physical Exam   Vital Signs with Ranges  Temp:  [97.7  F (36.5  C)] 97.7  F (36.5  C)  Pulse:  [32-62] 40  Resp:  [14-16] 16  BP: (118-137)/(57-78) 120/78  SpO2:  [97 %-98 %] 97 %  Wt Readings from Last 4 Encounters:   07/09/25 120.2 kg (265 lb)   07/09/25 121.1 kg (267 lb)   06/05/25 122.1 kg (269 lb 3.2 oz)   05/08/25 123.6 kg (272 lb 8 oz)     No intake/output data recorded.      Vitals: /78   Pulse (!) 40   Temp 97.7  F (36.5  C)   Resp 16   Ht 1.778 m (5' 10\")   Wt 120.2 kg (265 lb)   SpO2 97%   " "BMI 38.02 kg/m      Physical Exam:     Cardiovascular -S1-S2 normal, no murmurs  Extremities - There is no edema  Respiratory -CTAB      No lab results found in last 7 days.    Invalid input(s): \"TROPONINIES\"    Recent Labs   Lab 07/09/25  1441 07/09/25  1023   WBC 8.3 7.5   HGB 14.7 15.6   MCV 90 88    231    140   POTASSIUM 4.2 4.8   CHLORIDE 105 104   CO2 24 26   BUN 18.8 20.6   CR 0.80 0.95   GFRESTIMATED >90 88   ANIONGAP 10 10   CAMPBELL 8.5* 9.5   GLC 93 105*   ALBUMIN  --  4.6   PROTTOTAL  --  7.1   BILITOTAL  --  0.5   ALKPHOS  --  58   ALT  --  20   AST  --  22     No results for input(s): \"CHOL\", \"HDL\", \"LDL\", \"TRIG\", \"CHOLHDLRATIO\" in the last 34823 hours.  Recent Labs   Lab 07/09/25  1441 07/09/25  1023   WBC 8.3 7.5   HGB 14.7 15.6   HCT 45.1 46.8   MCV 90 88    231   IRON  --  70   IRONSAT  --  23 FEB  --  299   KIM  --  268     No results for input(s): \"PH\", \"PHV\", \"PO2\", \"PO2V\", \"SAT\", \"PCO2\", \"PCO2V\", \"HCO3\", \"HCO3V\" in the last 168 hours.  No results for input(s): \"NTBNPI\", \"NTBNP\" in the last 168 hours.  No results for input(s): \"DD\" in the last 168 hours.  No results for input(s): \"SED\", \"CRP\" in the last 168 hours.  Recent Labs   Lab 07/09/25  1441 07/09/25  1023    231     Recent Labs   Lab 07/09/25  1441   TSH 1.36     No results for input(s): \"COLOR\", \"APPEARANCE\", \"URINEGLC\", \"URINEBILI\", \"URINEKETONE\", \"SG\", \"UBLD\", \"URINEPH\", \"PROTEIN\", \"UROBILINOGEN\", \"NITRITE\", \"LEUKEST\", \"RBCU\", \"WBCU\" in the last 168 hours.    Imaging:  No results found for this or any previous visit (from the past 48 hours).    Echo:  No results found for this or any previous visit (from the past 4320 hours).    Clinically Significant Risk Factors Present on Admission           # Hypocalcemia: Lowest Ca = 8.5 mg/dL in last 2 days, will monitor and replace as appropriate                   # Obesity: Estimated body mass index is 38.02 kg/m  as calculated from the following:    Height as of this " "encounter: 1.778 m (5' 10\").    Weight as of this encounter: 120.2 kg (265 lb).             Cardiac Arrhythmia: Bradycardia, unspecified                 "

## 2025-07-09 NOTE — H&P
[facility]    History and Physical - Hospitalist Service       Date of Admission:  7/9/2025    Assessment & Plan      Quentin Wheeler is a 65 year old male with past medical history of heterozygous factor V Leiden gene mutation, history of iron overload without HFE gene mutation, obesity, obstructive sleep apnea, past anticoagulation and prior history of recurrent distal lower extremity DVT,  admitted 7/9/2025 with bradycardia and concerns of high degree heart block with possible need for permanent pacemaker placement.    Bradycardia  Concerns of high degree heart block on telemetry/EKG  Hospital admission for bradycardia, for details see admission note.  Giving blood at hematology clinic day of admission per routine with incidental finding of bradycardia, asymptomatic.  No history of heart disease or bradycardia reported.  ER vitals reviewed heart rate 40, 35 on EKG, temperature 97.7  F, blood pressure 120/78, respiratory rate 16, O2 saturation 97%  Admission labs hemoglobin 14.7, platelets 228,000, TSH 1.36, creatinine 0.8, calcium 8.5, magnesium 2.2, potassium 4.2  EKG sinus bradycardia second-degree AV block, ventricular rate 35 bpm    Admit observation  EP/cardiology consult, possible need for permanent pacemaker; patient discussed with Dr. Vazquez 7/9  Inpatient telemetry  Transthoracic echocardiogram (TTE) ordered and PENDING  Monitor BP, HR, clinical symptoms  Monitor blood sugars, hemoglobin, electrolytes  IV fluids 0.9 NS  Pharmacy consult for medication review and reconciliation  Regular diet, NPO after midnight 7/9 for possible pacemaker  AM labs    History of lower extremity DVT, left leg distal; April 2020, July 2024  Heterozygous factor V Leiden gene mutation  History of iron overload with monthly phlebotomy prior to admission  Not presently on anticoagulation, discontinued Fall 2024, previously on direct oral anticoagulant (DOAC).  Follows with hematology/oncology as  "outpatient for iron overload syndrome and prior history of DVT.  Over the past 7 months donating blood for iron overload under the direction of hematology.    Monitor for symptoms, DVT prophylaxis per protocol, follow-up with outpatient provider.  As above, not currently anticoagulation, discontinued 11/2024    Obstructive sleep apnea (CHAD)  Continue prior to admission CPAP  Monitor oxygen saturations, encourage incentive spirometry    Obesity  Past Body mass index is 38.02 kg/m ., past weight 265 lbs 0 oz  Consider future nutrition consult for education, counseling, long-term weight loss  Lifestyle modifications including diet, weight loss, exercise  Follow-up with outpatient provider    Disposition  Anticipated discharge timing see Disposition Plan below and Medically Ready for Discharge link  Anticipated discharge to home with his wife  Social work consulted for discharge planning    Change in hospitalist provider tomorrow with one of my [facility] hospitalist colleagues assuming care.          Diet: Regular Diet Adult  NPO for Medical/Clinical Reasons Except for: Ice Chips, MedsClears  DVT Prophylaxis: Pneumatic Compression Devices and Ambulate every shift  Santoro Catheter: Not present  Lines: None     Cardiac Monitoring: None  Code Status:  Full    Clinically Significant Risk Factors Present on Admission           # Hypocalcemia: Lowest Ca = 8.5 mg/dL in last 2 days, will monitor and replace as appropriate                   # Obesity: Estimated body mass index is 38.02 kg/m  as calculated from the following:    Height as of this encounter: 1.778 m (5' 10\").    Weight as of this encounter: 120.2 kg (265 lb).              Disposition Plan     Medically Ready for Discharge: Anticipated in 2-4 Days           Nestor Murillo MD  Hospitalist Service  [facility]  Securely message with exoro system (more info)  Text page via [facility] Paging/Directory "     ______________________________________________________________________    Chief Complaint   Bradycardia, concerns of high degree heart block, possible need for permanent pacemaker    History obtained from the patient, ER provider, chart review    History of Present Illness   Quentin Wheeler is a 65 year old male with past medical history outlined above presenting with bradycardia, heart rate in the 30s, noted at hematology clinic day of admission.  History of iron overload syndrome, donates blood monthly, session number 7-day of admission.  Incidentally noted to have heart rate in the 30s, asymptomatic.  Patient has no recollection of prior bradycardia and no history of heart disease or conduction disease.  Good exercise tolerance, recently biking 10 miles and walking up to half a mile without difficulties.  Denies shortness of breath, chest pain, presyncope, or syncope.  No changes in bowel or bladder function.  Good appetite.  No weight loss.  No history of diabetes, hypertension, hyperlipidemia, or cerebrovascular disease.  No current medications affecting heart rate reported.    ER vitals reviewed heart rate 40, 35 on EKG, temperature 97.7  F, blood pressure 120/78, respiratory rate 16, O2 saturation 97%  Admission labs hemoglobin 14.7, platelets 228,000, TSH 1.36, creatinine 0.8, calcium 8.5, magnesium 2.2, potassium 4.2  EKG sinus bradycardia second-degree AV block, ventricular rate 35 bpm    Past Medical History    No past medical history on file.  Patient Active Problem List   Diagnosis    Iron overload syndrome    AV block    Bradycardia       Past Surgical History   Past Surgical History:   Procedure Laterality Date    APPENDECTOMY      HERNIA REPAIR         Prior to Admission Medications   Prior to Admission Medications   Prescriptions Last Dose Informant Patient Reported? Taking?   Ergocalciferol (VITAMIN D2) 50 MCG (2000 UT) TABS Past Week Self Yes Yes   Sig: Take 2,000 Units by mouth daily.    psyllium (METAMUCIL/KONSYL) capsule Past Week Self Yes Yes   Sig: Take 1 capsule by mouth daily.   vitamin C (ASCORBIC ACID) 1000 MG TABS Past Week Self Yes Yes   Sig: Take 1,000 mg by mouth daily.   zinc 50 MG TABS Past Week Self Yes Yes   Sig: Take 50 mg by mouth daily.      Facility-Administered Medications: None        Review of Systems    Review of systems otherwise negative and per HPI above    Social History   I have reviewed this patient's social history and updated it with pertinent information if needed.  Social History     Tobacco Use    Smoking status: Never    Smokeless tobacco: Never   Substance Use Topics    Alcohol use: Yes    Drug use: Never     Non-smoker.  No alcohol use.  , lives with his wife.  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    Family History   I have reviewed this patient's family history and updated it with pertinent information if needed.  Family History   Problem Relation Age of Onset    Factor V Leiden deficiency Mother     Hypertension Sister     Factor V Leiden deficiency Brother      Negative for heart disease or pacemakers per his report.    Allergies   Allergies   Allergen Reactions    Tetracycline Rash        Physical Exam   Vital Signs: Temp: 97.7  F (36.5  C)   BP: 120/78 Pulse: (!) 40   Resp: 16 SpO2: 97 %      Weight: 265 lbs 0 oz    GENERAL awake and alert, lying in bed, pleasant and interactive   EYES pupils equal, round; no conjunctival injection or jaundice  ENT no obvious lesions or exudates  LYMPH no cervical, submandibular, or supraclavicular adenopathy  LUNGS no wheezes or crackles  HEART S1,S2 with RRR, no rubs or gallops  ABDOMEN soft, non-tender; no guarding, rebound, or rigidity  MUSCULOSKELETAL no gross joint deformities; with mild pedal edema  SKIN warm and dry  NEURO moves upper and lower extremities spontaneously and to command; no focal weakness appreciated; sensation intact to light touch upper and lower extremities  PSYCHIATRIC awake and  alert; answers questions and follows simple commands    Medical Decision Making             Data     I have personally reviewed the following data over the past 24 hrs:    8.3  \   14.7   / 228     139 105 18.8 /  93   4.2 24 0.80 \     ALT: 20 AST: 22 AP: 58 TBILI: 0.5   ALB: 4.6 TOT PROTEIN: 7.1 LIPASE: N/A     TSH: 1.36 T4: N/A A1C: N/A     Ferritin:  268 % Retic:  N/A LDH:  N/A       Imaging results reviewed over the past 24 hrs:   No results found for this or any previous visit (from the past 24 hours).

## 2025-07-09 NOTE — LETTER
7/9/2025      Quentin Wheeler  [address] [place] [id number]      Dear Colleague,    Thank you for referring your patient, Quentin Wheeler, to the [facility]. Please see a copy of my visit note below.    Hematology follow-up visit:  Date on this visit: 7/09/25    Diagnosis.  Recurrent DVT.  Heterozygous factor V Leiden gene mutation.  History of iron overload without HFE gene mutation.    Primary Physician: Sachin Dozier     History Of Present Illness:  Mr. Wheeler is a 65 year old male who presents with recurrent DVT and iron overload.    Patient was being followed by Dr. Ruiz.  I have reviewed previous hematology records and have copied and updated from prior notes.  He is now transferring his care to me.    He has a history of obesity, obstructive sleep apnea with inconsistent CPAP use, rivaroxaban therapy for apparent recurrent distal lower extremity DVT and elevated serum ferritin's documented multiple times over 1000 within the last 2 years.     His history of recurrent deep venous thrombosis really involves distal lower extremity deep venous events; one in April, 2020 and another which is of questionable significance in July, 2024.  The latter event may be thrombus which is chronic and well-organized.  Neither event were truly proximal deep venous thromboses.  He was found to have heterozygous factor V Leiden mutation.  Protein C and protein S and Antithrombin III and prothrombin gene mutations were unremarkable.  Anticardiolipin antibody and antibeta 2 glycoprotein antibody were negative.          He was on Xarelto but in January 2025, Dr. Ruiz discontinued rivaroxaban and asked him to wear compression stockings (20-30 mmHg).    With regards to iron overload, he has had elevated ferritin above 1000 over the last couple of years.  Hemochromatosis gene mutation analysis was unremarkable for any mutation in the HFE gene.    CMP was unremarkable.  12/31/2024   Ferriscan revealed a slightly increased average liver iron concentration at 1.9 mg/grams dry tissue (0.17-1.8). FibroScan revealed increased liver stiffness of 1.4 to meters/second.  High probability of being normal was <1.3 m/sec..  Hepatitis B/C serologies, alpha-fetoprotein, ceruloplasmin, alpha-1 antitrypsin were unremarkable.    He was asked to start monthly phlebotomy in January 2025    Interval history:    He comes in today accompanied by his wife.  Since January 2025, he has had monthly phlebotomy.  He is tolerating it well.  Overall he feels well.  He tells me that since his blood clot in the left leg in 2020, off-and-on he has had swelling of the left leg and veins 1 such episode happened in July 2024 he had the ultrasound done which showed age-indeterminate blood clot in the posterior tibial vein.  He took Xarelto for about 4 months and then it was stopped in November 2024 when he saw Dr. Ruiz.  Overall he has not noticed any significant change in how his leg feels over the last 5 years.  He denies any nausea vomiting diarrhea constipation or bleeding.  He denies any fevers infections or shortness of breath.  Energy is good and remains fully functional.      ROS:  A comprehensive ROS was otherwise neg      Past Medical/Surgical History:  No past medical history on file.  Past Surgical History:   Procedure Laterality Date     APPENDECTOMY       HERNIA REPAIR       Allergies:  Allergies as of 07/09/2025 - Reviewed 07/09/2025   Allergen Reaction Noted     Tetracycline Rash 11/26/2024     Current Medications:  Current Outpatient Medications   Medication Sig Dispense Refill     Ergocalciferol (VITAMIN D2) 50 MCG (2000 UT) TABS Take by mouth.       topiramate (TOPAMAX) 50 MG tablet Take 50 mg by mouth at bedtime.       vitamin C (ASCORBIC ACID) 1000 MG TABS Take 1,000 mg by mouth daily.       zinc 50 MG TABS Take by mouth.        Family History:  Family History   Problem Relation Age of Onset     Factor V Leiden  deficiency Mother      Hypertension Sister      Factor V Leiden deficiency Brother      No family history of bleeding or clotting disorder.  Social History:  Social History     Socioeconomic History     Marital status:      Spouse name: Not on file     Number of children: Not on file     Years of education: Not on file     Highest education level: Not on file   Occupational History     Not on file   Tobacco Use     Smoking status: Never     Smokeless tobacco: Never   Substance and Sexual Activity     Alcohol use: Yes     Drug use: Never     Sexual activity: Not on file   Other Topics Concern     Not on file   Social History Narrative     Not on file     Social Drivers of Health     Financial Resource Strain: Not on file   Food Insecurity: Not on file   Transportation Needs: Not on file   Physical Activity: Not on file   Stress: Not on file   Social Connections: Not on file   Interpersonal Safety: Not on file   Housing Stability: Not on file     Physical Exam:  /60   Pulse 62   Resp 16   Wt 121.1 kg (267 lb)   SpO2 98%   BMI 37.76 kg/m      Wt Readings from Last 4 Encounters:   07/09/25 121.1 kg (267 lb)   06/05/25 122.1 kg (269 lb 3.2 oz)   05/08/25 123.6 kg (272 lb 8 oz)   04/10/25 122 kg (269 lb)     CONSTITUTIONAL: no acute distress  EYES: PERRLA, no palor or icterus.   ENT/MOUTH: no mouth lesions. Ears normal  CVS: s1s2 no m r g .   RESPIRATORY: clear to auscultation b/l  GI: soft non tender no hepatosplenomegaly  NEURO: AAOX3  Grossly non focal neuro exam  INTEGUMENT: no obvious rashes  LYMPHATIC: no palpable cervical, supraclavicular, axillary or inguinal LAD  MUSCULOSKELETAL: Unremarkable. No bony tenderness.   EXTREMITIES: no edema  PSYCH: Mentation, mood and affect are normal. Decision making capacity is intact    Laboratory/Imaging Studies      Reviewed    7/9/2025  CBC unremarkable  CMP unremarkable    Iron 78 TIBC 299.  Iron saturation index 23%.  Ferritin is 268 today.    Ferritin was  1200 on 1/14/2025 before the start of phlebotomy.    6/7/2024  CMP was unremarkable      7/25/2024.  Bilateral lower extremity Doppler ultrasound.  IMPRESSION:     1. No evidence of right-sided DVT.   2.  No DVT in the left thigh or popliteal vein.   3.  There is a short segment of age-indeterminate deep venous thrombosis in the left posterior tibial vein in the distal lower leg. 1.5 cm.   4.  Message left with clinician's office at the time of examination.     ASSESSMENT/PLAN:      Iron overload.  Likely hereditary hemochromatosis although No HFE gene mutation was found. We know that there is a small minority of  patients with hereditary hemochromatosis, in whom there could be mutations in other genes.  Especially for the type 3 and type 4B hereditary hemochromatosis there could be mutation in the TFR2 gene, for type 3 and [id number] in type 4B.    He has been started on phlebotomy in January 2025 and since then he has been getting it monthly and ferritin is now down to 268.  We do not have a baseline iron saturation index but today iron saturation index is 23%.  He will get phlebotomy today and going forward I would recommend that we do phlebotomy every other month.  Will try to keep ferritin <100 although he does not need to have such as strict control of ferritin and we can reassess need for phlebotomy in the future.      Left distal DVT involving posterior tibial vein-   He apparently had recurrent distal lower extremity DVT.  He initially had it in April 2020 and then in July 2024 he was noted to have a short segment of age-indeterminate DVT in the left posterior tibial vein in the distal lower leg because ultrasound was not done as off-and-on he has had left leg swelling but this has been going on off and on since the first blood clot.  I am not exactly sure whether the clot found in July 2024 was actually a new DVT or chronic /epithelialized clot.  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    He is heterozygous for factor V Leiden mutation.    He was put back on rivaroxaban in July and took it till November 2024 when it was stopped because of lack of evidence of actual recurrent DVT and his DVT was not proximal clot.    I would recommend repeating ultrasound of the left lower extremity to follow-up on this age-indeterminate clot in the left posterior tibial vein.  If the age-indeterminate clot is again found, then it would give us another evidence that it is a chronic clot.    Continue to hold rivaroxaban.  He wears compression stockings especially when he travels.    We discussed that his family especially his daughters need to be tested as people who have factor V Leiden mutation, and high estrogen states like pregnancy or taking birth control pills, have a higher risk for developing blood clots.    I will see him back in 6 months.    I answered all of his and his wife's questions to their satisfaction.  They are agreeable and comfortable with the plan.    Frederic Avila MD    I spent >42 minutes on this visit on the date of service, including the face to face time, reviewing records and labs and imaging and placing new orders as well as coordination of care and documentation.                 Again, thank you for allowing me to participate in the care of your patient.        Sincerely,        Frederic Avila MD    Electronically signed

## 2025-07-09 NOTE — ED PROVIDER NOTES
"  Emergency Department Note      History of Present Illness     Chief Complaint   Bradycardia      HPI   Quentin Wheeler is a 65 year old male who presents to the emergency department with concern for bradycardia.  Patient was referred to the ER from hematology clinic after he had a blood collection.  Following blood collection they repeated his vitals and found him to be bradycardic.  Patient denies chest pain, shortness of breath, dizziness, lightheadedness.  EKG was ordered in the clinic for bradycardia showing concern for second-degree AV block and recommendation for transfer to the emergency department.  Patient continues to be asymptomatic.  Does not take any current medication.    Independent Historian   None    Review of External Notes   Reviewed hematology note from today history of iron overload, likely had arterial hemochromatosis.  Left distal DVT off of blood thinners at this time.    Past Medical History     Medical History and Problem List   No past medical history on file.    Medications   Ergocalciferol (VITAMIN D2) 50 MCG (2000 UT) TABS  topiramate (TOPAMAX) 50 MG tablet  vitamin C (ASCORBIC ACID) 1000 MG TABS  zinc 50 MG TABS        Surgical History   Past Surgical History:   Procedure Laterality Date    APPENDECTOMY      HERNIA REPAIR         Physical Exam     Patient Vitals for the past 24 hrs:   Pulse Resp SpO2 Height Weight   07/09/25 1434 (!) 40 16 97 % -- --   07/09/25 1430 -- -- -- 1.778 m (5' 10\") 120.2 kg (265 lb)     Physical Exam  General: Resting on the bed.  Head: No obvious trauma to head.  Ears, Nose, Throat:  External ears normal.  Nose normal.  ctive.   Neck: Normal range of motion.  Neck supple.   CV: Bradycardia rate and rhythm.  No murmurs.    2+ radial pulses   Respiratory: Effort normal and breath sounds normal.  No wheezing or crackles.   Gastrointestinal: Soft.  No distension. There is no tenderness.    Musculoskeletal: Non tender non edematous calves  Neuro: Alert. " Moving all extremities appropriately.  Normal speech.    Skin: Skin is warm and dry.  No rash noted.     Diagnostics     Lab Results   Labs Ordered and Resulted from Time of ED Arrival to Time of ED Departure - No data to display    Imaging   No orders to display       EKG     ECG results from 07/09/25   EKG 12-lead, tracing only     Value    Systolic Blood Pressure     Diastolic Blood Pressure     Ventricular Rate 35    Atrial Rate 60    MI Interval     QRS Duration 100        QTc 361    P Axis 48    R AXIS -17    T Axis 16    Interpretation ECG      ** Critical Test Result: Low HR , high grade AV Block  Sinus rhythm with 2nd degree A-V block (Mobitz I) with 2:1 A-V conduction  Abnormal ECG  No previous ECGs available  Confirmed by GENERATED REPORT, COMPUTER ([id number]),  Florentino Brown ([id number]) on 7/9/2025 2:43:12 PM         Independent Interpretation   None    ED Course      Medications Administered   Medications - No data to display    Procedures   Procedures     Discussion of Management   Admitting Hospitalist, see below  Cardiology, see below    ED Course   ED Course as of 07/09/25 1659   Wed Jul 09, 2025   1449 I met with patient, obtained history and performed examination.     1506 I spoke with Dr Vazquez from cardiology who recommends admission and cards consult    1513 Updated patient on plan of care.   1532 I spoke with Dr Murillo        Additional Documentation  None    Medical Decision Making / Diagnosis     CMS Diagnoses: None    MIPS   None               MDM   Quentin Wheeler is a 65 year old male who presents to the emergency department for bradycardia.   vital signs are notable for markedly bradycardic.  Broad differential was considered including not limited to anemia, electrolyte, metabolic, renal dysfunction, thyroid disease, hypomagnesia, etc.  CBC without leukocytosis or anemia.  BMP without acute electrolyte, metabolic or renal dysfunction.  Magnesium level is normal.  Thyroid function  is normal.  Patient has no chest pain or shortness of breath.  He is completely asymptomatic.  EKG is concerning for hide grade AV block, consulted cardiology who felt the patient should be admitted and evaluated the ED.  Patient remained vitally stable.  No indication for emergent pacing in the ER although pads were placed along with nearby ZOLL.  Updated patient on plan.  He is agreeable.  Consulted with the hospitalist who graciously accepted.    Disposition   The patient was admitted to the hospital.     Diagnosis     ICD-10-CM    1. Bradycardia  R00.1       2. AV block  I44.30            Discharge Medications   New Prescriptions    No medications on file         MD Nathaniel Pimentel Jennifer L, MD  07/09/25 0865

## 2025-07-09 NOTE — ED TRIAGE NOTES
Pt was brought in via ems from [facility] where he was being seen in clinic and lab for chronic high ferratin, for this they removed 500 ml of blood for the ferratin, HR was in 60's before blood draw, after HR in 30's BP was good and was asymptomatic. 500 NS given on route.    Triage Assessment (Adult)       Row Name 07/09/25 1904          Triage Assessment    Airway WDL WDL        Respiratory WDL    Respiratory WDL WDL        Skin Circulation/Temperature WDL    Skin Circulation/Temperature WDL WDL        Cardiac WDL    Cardiac WDL X;rhythm     Pulse Rate & Regularity bradycardic        Peripheral/Neurovascular WDL    Peripheral Neurovascular WDL WDL        Cognitive/Neuro/Behavioral WDL    Cognitive/Neuro/Behavioral WDL WDL

## 2025-07-09 NOTE — PATIENT INSTRUCTIONS
Phlebotomy today    Going forward, phlebotomy every 2 months    Schedule US of left leg    See me in 6 months

## 2025-07-09 NOTE — NURSING NOTE
"Oncology Rooming Note    July 9, 2025 10:43 AM   Quentin Wheeler is a 65 year old male who presents for:    Chief Complaint   Patient presents with    Oncology Clinic Visit     6 Mo Follow Up      Initial Vitals: /60   Pulse 62   Resp 16   Wt 121.1 kg (267 lb)   SpO2 98%   BMI 37.76 kg/m   Estimated body mass index is 37.76 kg/m  as calculated from the following:    Height as of 1/6/25: 1.791 m (5' 10.51\").    Weight as of this encounter: 121.1 kg (267 lb). Body surface area is 2.45 meters squared.  No Pain (0) Comment: Data Unavailable   No LMP for male patient.  Allergies reviewed: Yes  Medications reviewed: Yes    Medications: Medication refills not needed today.  Pharmacy name entered into [facility]: [id number] [id number]    Frailty Screening:   Is the patient here for a new oncology consult visit in cancer care? 2. No    PHQ9:  Did this patient require a PHQ9?: No      Clinical concerns: None Noted       Jo Ann Campbell MA            "

## 2025-07-10 ENCOUNTER — APPOINTMENT (OUTPATIENT)
Dept: CARDIOLOGY | Facility: CLINIC | Age: 65
DRG: 309 | End: 2025-07-10
Attending: HOSPITALIST
Payer: COMMERCIAL

## 2025-07-10 ENCOUNTER — APPOINTMENT (OUTPATIENT)
Dept: CARDIOLOGY | Facility: CLINIC | Age: 65
DRG: 309 | End: 2025-07-10
Attending: INTERNAL MEDICINE
Payer: COMMERCIAL

## 2025-07-10 ENCOUNTER — APPOINTMENT (OUTPATIENT)
Dept: CARDIOLOGY | Facility: CLINIC | Age: 65
End: 2025-07-10
Attending: INTERNAL MEDICINE
Payer: COMMERCIAL

## 2025-07-10 VITALS
TEMPERATURE: 98.2 F | BODY MASS INDEX: 37.92 KG/M2 | HEART RATE: 59 BPM | SYSTOLIC BLOOD PRESSURE: 139 MMHG | WEIGHT: 264.9 LBS | OXYGEN SATURATION: 96 % | DIASTOLIC BLOOD PRESSURE: 55 MMHG | HEIGHT: 70 IN | RESPIRATION RATE: 17 BRPM

## 2025-07-10 PROBLEM — I44.1 HEART BLOCK AV SECOND DEGREE: Status: ACTIVE | Noted: 2025-07-10

## 2025-07-10 PROBLEM — I44.1 AV BLOCK, MOBITZ 1: Status: ACTIVE | Noted: 2025-07-10

## 2025-07-10 LAB
ANION GAP SERPL CALCULATED.3IONS-SCNC: 10 MMOL/L (ref 7–15)
BUN SERPL-MCNC: 16 MG/DL (ref 8–23)
CALCIUM SERPL-MCNC: 8.9 MG/DL (ref 8.8–10.4)
CHLORIDE SERPL-SCNC: 105 MMOL/L (ref 98–107)
CREAT SERPL-MCNC: 0.81 MG/DL (ref 0.67–1.17)
CREAT SERPL-MCNC: 0.84 MG/DL (ref 0.67–1.17)
EGFRCR SERPLBLD CKD-EPI 2021: >90 ML/MIN/1.73M2
EGFRCR SERPLBLD CKD-EPI 2021: >90 ML/MIN/1.73M2
GLUCOSE SERPL-MCNC: 110 MG/DL (ref 70–99)
HCO3 SERPL-SCNC: 24 MMOL/L (ref 22–29)
LVEF ECHO: NORMAL
POTASSIUM SERPL-SCNC: 4.7 MMOL/L (ref 3.4–5.3)
SODIUM SERPL-SCNC: 139 MMOL/L (ref 135–145)

## 2025-07-10 PROCEDURE — 82565 ASSAY OF CREATININE: CPT | Performed by: INTERNAL MEDICINE

## 2025-07-10 PROCEDURE — 75561 CARDIAC MRI FOR MORPH W/DYE: CPT

## 2025-07-10 PROCEDURE — 999N000208 ECHOCARDIOGRAM COMPLETE

## 2025-07-10 PROCEDURE — 96361 HYDRATE IV INFUSION ADD-ON: CPT

## 2025-07-10 PROCEDURE — 255N000002 HC RX 255 OP 636: Performed by: INTERNAL MEDICINE

## 2025-07-10 PROCEDURE — A9585 GADOBUTROL INJECTION: HCPCS | Performed by: INTERNAL MEDICINE

## 2025-07-10 PROCEDURE — 255N000002 HC RX 255 OP 636: Performed by: HOSPITALIST

## 2025-07-10 PROCEDURE — 93017 CV STRESS TEST TRACING ONLY: CPT

## 2025-07-10 PROCEDURE — G0378 HOSPITAL OBSERVATION PER HR: HCPCS

## 2025-07-10 PROCEDURE — 93016 CV STRESS TEST SUPVJ ONLY: CPT | Performed by: INTERNAL MEDICINE

## 2025-07-10 PROCEDURE — 36415 COLL VENOUS BLD VENIPUNCTURE: CPT | Performed by: INTERNAL MEDICINE

## 2025-07-10 PROCEDURE — 258N000003 HC RX IP 258 OP 636: Performed by: HOSPITALIST

## 2025-07-10 PROCEDURE — 93306 TTE W/DOPPLER COMPLETE: CPT | Mod: 26 | Performed by: INTERNAL MEDICINE

## 2025-07-10 PROCEDURE — 93018 CV STRESS TEST I&R ONLY: CPT | Performed by: INTERNAL MEDICINE

## 2025-07-10 PROCEDURE — 99223 1ST HOSP IP/OBS HIGH 75: CPT | Mod: 25 | Performed by: INTERNAL MEDICINE

## 2025-07-10 PROCEDURE — 36415 COLL VENOUS BLD VENIPUNCTURE: CPT | Performed by: HOSPITALIST

## 2025-07-10 PROCEDURE — 80048 BASIC METABOLIC PNL TOTAL CA: CPT | Performed by: HOSPITALIST

## 2025-07-10 PROCEDURE — 99232 SBSQ HOSP IP/OBS MODERATE 35: CPT

## 2025-07-10 PROCEDURE — 210N000002 HC R&B HEART CARE

## 2025-07-10 PROCEDURE — 75561 CARDIAC MRI FOR MORPH W/DYE: CPT | Mod: 26 | Performed by: INTERNAL MEDICINE

## 2025-07-10 RX ORDER — DIAZEPAM 5 MG/1
5 TABLET ORAL EVERY 30 MIN PRN
Status: DISCONTINUED | OUTPATIENT
Start: 2025-07-10 | End: 2025-07-10

## 2025-07-10 RX ORDER — GADOBUTROL 604.72 MG/ML
15 INJECTION INTRAVENOUS ONCE
Status: COMPLETED | OUTPATIENT
Start: 2025-07-10 | End: 2025-07-10

## 2025-07-10 RX ORDER — LORAZEPAM 0.5 MG/1
0.5 TABLET ORAL EVERY 30 MIN PRN
Status: DISCONTINUED | OUTPATIENT
Start: 2025-07-10 | End: 2025-07-10

## 2025-07-10 RX ORDER — LIDOCAINE 40 MG/G
CREAM TOPICAL
Status: DISCONTINUED | OUTPATIENT
Start: 2025-07-10 | End: 2025-07-10

## 2025-07-10 RX ORDER — SODIUM CHLORIDE 9 MG/ML
INJECTION, SOLUTION INTRAVENOUS CONTINUOUS PRN
Status: DISCONTINUED | OUTPATIENT
Start: 2025-07-10 | End: 2025-07-10

## 2025-07-10 RX ORDER — NITROGLYCERIN 0.4 MG/1
0.4 TABLET SUBLINGUAL EVERY 5 MIN PRN
Status: DISCONTINUED | OUTPATIENT
Start: 2025-07-10 | End: 2025-07-10

## 2025-07-10 RX ADMIN — SODIUM CHLORIDE: 0.9 INJECTION, SOLUTION INTRAVENOUS at 05:56

## 2025-07-10 RX ADMIN — GADOBUTROL 15 ML: 604.72 INJECTION INTRAVENOUS at 15:19

## 2025-07-10 RX ADMIN — PERFLUTREN 2 ML (DILUTED): 6.52 INJECTION, SUSPENSION INTRAVENOUS at 11:27

## 2025-07-10 ASSESSMENT — ACTIVITIES OF DAILY LIVING (ADL)
ADLS_ACUITY_SCORE: 49
ADLS_ACUITY_SCORE: 43
ADLS_ACUITY_SCORE: 49
ADLS_ACUITY_SCORE: 43
ADLS_ACUITY_SCORE: 49
ADLS_ACUITY_SCORE: 53
ADLS_ACUITY_SCORE: 43
ADLS_ACUITY_SCORE: 42
ADLS_ACUITY_SCORE: 43
ADLS_ACUITY_SCORE: 43
ADLS_ACUITY_SCORE: 49
ADLS_ACUITY_SCORE: 43
ADLS_ACUITY_SCORE: 49
ADLS_ACUITY_SCORE: 43
ADLS_ACUITY_SCORE: 43
ADLS_ACUITY_SCORE: 49
ADLS_ACUITY_SCORE: 42
ADLS_ACUITY_SCORE: 42
ADLS_ACUITY_SCORE: 43
ADLS_ACUITY_SCORE: 49
ADLS_ACUITY_SCORE: 53

## 2025-07-10 NOTE — PROGRESS NOTES
Orientations: A&O x4  Vitals/Pain: VSS on RA, ex bradycardic  Tele: SB w/2nd degree type 1  Lines/Drains: NS infusing at 75   Skin/Wounds: intact  GI/: continent  Labs: Abnormal/Trends, Electrolyte Replacement- NA  Ambulation/Assist: SBA  Sleep Quality: good  Plan: possibly PPM today

## 2025-07-10 NOTE — CONSULTS
[facility]    Cardiac Electrophysiology Consultation     Date of Admission:  7/9/2025  Date of Consult (When I saw the patient): 07/10/25    Assessment & Plan   Quentin Wheeler is a 65 year old male who was admitted on 7/9/2025. I was asked to see the patient for heart block. Delightful 64 yo male not on any AVN blocker drugs who was diagnosed with hemochromatosis without much symptoms requiring phlebotomy monthly. Pt was noted to be eugenia upon completion of phlebotomy yesterday. ECGs showed sinus rhythm with AV Wenckebach and 2:1 AV block. No previous ECG for comparison. First time he was told to have bradycardia. Denies sob, lightheadedness or reduced exercise tolerance. Echo and MRI pending.    Cardiac involvement can occur with hemochromatosis but usually involve the sinus node and not the AV node. Prudent to get MRI to ensure no cardiac involvement with his hemochromatosis, however. Obtain treadmill ECG to assess where in the AVN does block occur. Suspect not infranodal region as I expect one-to-one AV conduction with faster sinus rate. If so no need for a pacemaker at this point. On the other hand if AV block worsens with faster sinus rate then a ppm is needed due to higher risk of developing high grade AVB.     Amador Ruiz MD    Code Status    Full Code    Primary Care Physician   Sachin Dozier    History is obtained from the patient    Past Medical History   I have reviewed this patient's medical history and updated it with pertinent information if needed.   No past medical history on file.    Past Surgical History   I have reviewed this patient's surgical history and updated it with pertinent information if needed.  Past Surgical History:   Procedure Laterality Date    APPENDECTOMY      HERNIA REPAIR         Prior to Admission Medications   Prior to Admission Medications   Prescriptions Last Dose Informant Patient Reported? Taking?   Ergocalciferol (VITAMIN D2) 50 MCG (2000  UT) TABS Past Week Self Yes Yes   Sig: Take 2,000 Units by mouth daily.   psyllium (METAMUCIL/KONSYL) capsule Past Week Self Yes Yes   Sig: Take 1 capsule by mouth daily.   vitamin C (ASCORBIC ACID) 1000 MG TABS Past Week Self Yes Yes   Sig: Take 1,000 mg by mouth daily.   zinc 50 MG TABS Past Week Self Yes Yes   Sig: Take 50 mg by mouth daily.      Facility-Administered Medications: None     Allergies   Allergies   Allergen Reactions    Tetracycline Rash       Social History   I have reviewed this patient's social history and updated it with pertinent information if needed. Quentin Wheeler  reports that he has never smoked. He has never used smokeless tobacco. He reports current alcohol use. He reports that he does not use drugs.    Family History   I have reviewed this patient's family history and updated it with pertinent information if needed.   Family History   Problem Relation Age of Onset    Factor V Leiden deficiency Mother     Hypertension Sister     Factor V Leiden deficiency Brother        Review of Systems   Comprehensive review of systems was performed with pertinent positives and negatives listed in assessment and plan section.    Physical Exam   Temp: 97.3  F (36.3  C) Temp src: Oral BP: 120/73 Pulse: 57   Resp: 18 SpO2: 96 % O2 Device: None (Room air)    Vital Signs with Ranges  Temp:  [97  F (36.1  C)-98.2  F (36.8  C)] 97.3  F (36.3  C)  Pulse:  [31-67] 57  Resp:  [14-18] 18  BP: (103-180)/() 120/73  SpO2:  [93 %-99 %] 96 %  264 lbs 14.4 oz    Constitutional: awake, alert, cooperative, no apparent distress, and appears stated age  Eyes: Lids and lashes normal, pupils equal, round and reactive to light, extra ocular muscles intact, sclera clear, conjunctiva normal  ENT: Normocephalic, without obvious abnormality, atraumatic, sinuses nontender on palpation, external ears without lesions, oral pharynx with moist mucous membranes, tonsils without erythema or exudates, gums normal and good  dentition.  Hematologic / Lymphatic: no cervical lymphadenopathy  Respiratory: No increased work of breathing, good air exchange, clear to auscultation bilaterally, no crackles or wheezing  Cardiovascular: Normal apical impulse, regular rate and rhythm, normal S1 and S2, no S3 or S4, and no murmur noted  GI: No scars, normal bowel sounds, soft, non-distended, non-tender, no masses palpated, no hepatosplenomegally  Skin: no bruising or bleeding  Musculoskeletal: There is no redness, warmth, or swelling of the joints.  Full range of motion noted.    Neurologic: Awake, alert,  Neuropsychiatric: General: normal, calm, and normal eye contact    Data   I personally reviewed all recent ECGs and images.  Recent Results (from the past 24 hours)   CBC with platelets differential    Narrative    The following orders were created for panel order CBC with platelets differential.  Procedure                               Abnormality         Status                     ---------                               -----------         ------                     CBC with platelets and ...[[id number]]                      Final result                 Please view results for these tests on the individual orders.   Iron and iron binding capacity   Result Value Ref Range    Iron 70 61 - 157 ug/dL    Iron Binding Capacity 299 240 - 430 ug/dL    Iron Sat Index 23 15 - 46 %   Ferritin   Result Value Ref Range    Ferritin 268 31 - 409 ng/mL   Comprehensive metabolic panel   Result Value Ref Range    Sodium 140 135 - 145 mmol/L    Potassium 4.8 3.4 - 5.3 mmol/L    Carbon Dioxide (CO2) 26 22 - 29 mmol/L    Anion Gap 10 7 - 15 mmol/L    Urea Nitrogen 20.6 8.0 - 23.0 mg/dL    Creatinine 0.95 0.67 - 1.17 mg/dL    GFR Estimate 88 >60 mL/min/1.73m2    Calcium 9.5 8.8 - 10.4 mg/dL    Chloride 104 98 - 107 mmol/L    Glucose 105 (H) 70 - 99 mg/dL    Alkaline Phosphatase 58 40 - 150 U/L    AST 22 0 - 45 U/L    ALT 20 0 - 70 U/L    Protein Total 7.1 6.4 - 8.3  g/dL    Albumin 4.6 3.5 - 5.2 g/dL    Bilirubin Total 0.5 <=1.2 mg/dL   CBC with platelets and differential   Result Value Ref Range    WBC Count 7.5 4.0 - 11.0 10e3/uL    RBC Count 5.32 4.40 - 5.90 10e6/uL    Hemoglobin 15.6 13.3 - 17.7 g/dL    Hematocrit 46.8 40.0 - 53.0 %    MCV 88 78 - 100 fL    MCH 29.3 26.5 - 33.0 pg    MCHC 33.3 31.5 - 36.5 g/dL    RDW 12.4 10.0 - 15.0 %    Platelet Count 231 150 - 450 10e3/uL    % Neutrophils 60 %    % Lymphocytes 29 %    % Monocytes 8 %    % Eosinophils 3 %    % Basophils 1 %    % Immature Granulocytes 0 %    NRBCs per 100 WBC 0 <1 /100    Absolute Neutrophils 4.5 1.6 - 8.3 10e3/uL    Absolute Lymphocytes 2.1 0.8 - 5.3 10e3/uL    Absolute Monocytes 0.6 0.0 - 1.3 10e3/uL    Absolute Eosinophils 0.2 0.0 - 0.7 10e3/uL    Absolute Basophils 0.1 0.0 - 0.2 10e3/uL    Absolute Immature Granulocytes 0.0 <=0.4 10e3/uL    Absolute NRBCs 0.0 10e3/uL   EKG 12-lead, tracing only   Result Value Ref Range    Systolic Blood Pressure  mmHg    Diastolic Blood Pressure  mmHg    Ventricular Rate 35 BPM    Atrial Rate 60 BPM    IN Interval  ms    QRS Duration 100 ms     ms    QTc 361 ms    P Axis 48 degrees    R AXIS -17 degrees    T Axis 16 degrees    Interpretation ECG       ** Critical Test Result: Low HR , AV Block  Sinus rhythm with 2nd degree A-V block (Mobitz I) with 2:1 A-V conduction  Abnormal ECG  No previous ECGs available  Confirmed by GENERATED REPORT, COMPUTER ([id number]),  Florentino Brown ([id number]) on 7/9/2025 2:43:12 PM     CBC with platelets differential    Narrative    The following orders were created for panel order CBC with platelets differential.  Procedure                               Abnormality         Status                     ---------                               -----------         ------                     CBC with platelets and ...[[id number]]                      Final result                 Please view results for these tests on the individual orders.    Basic metabolic panel   Result Value Ref Range    Sodium 139 135 - 145 mmol/L    Potassium 4.2 3.4 - 5.3 mmol/L    Chloride 105 98 - 107 mmol/L    Carbon Dioxide (CO2) 24 22 - 29 mmol/L    Anion Gap 10 7 - 15 mmol/L    Urea Nitrogen 18.8 8.0 - 23.0 mg/dL    Creatinine 0.80 0.67 - 1.17 mg/dL    GFR Estimate >90 >60 mL/min/1.73m2    Calcium 8.5 (L) 8.8 - 10.4 mg/dL    Glucose 93 70 - 99 mg/dL   Magnesium   Result Value Ref Range    Magnesium 2.2 1.7 - 2.3 mg/dL   TSH with free T4 reflex   Result Value Ref Range    TSH 1.36 0.30 - 4.20 uIU/mL   CBC with platelets and differential   Result Value Ref Range    WBC Count 8.3 4.0 - 11.0 10e3/uL    RBC Count 5.04 4.40 - 5.90 10e6/uL    Hemoglobin 14.7 13.3 - 17.7 g/dL    Hematocrit 45.1 40.0 - 53.0 %    MCV 90 78 - 100 fL    MCH 29.2 26.5 - 33.0 pg    MCHC 32.6 31.5 - 36.5 g/dL    RDW 12.4 10.0 - 15.0 %    Platelet Count 228 150 - 450 10e3/uL    % Neutrophils 61 %    % Lymphocytes 28 %    % Monocytes 7 %    % Eosinophils 2 %    % Basophils 1 %    % Immature Granulocytes 0 %    NRBCs per 100 WBC 0 <1 /100    Absolute Neutrophils 5.1 1.6 - 8.3 10e3/uL    Absolute Lymphocytes 2.3 0.8 - 5.3 10e3/uL    Absolute Monocytes 0.6 0.0 - 1.3 10e3/uL    Absolute Eosinophils 0.2 0.0 - 0.7 10e3/uL    Absolute Basophils 0.0 0.0 - 0.2 10e3/uL    Absolute Immature Granulocytes 0.0 <=0.4 10e3/uL    Absolute NRBCs 0.0 10e3/uL   Basic metabolic panel   Result Value Ref Range    Sodium 139 135 - 145 mmol/L    Potassium 4.7 3.4 - 5.3 mmol/L    Chloride 105 98 - 107 mmol/L    Carbon Dioxide (CO2) 24 22 - 29 mmol/L    Anion Gap 10 7 - 15 mmol/L    Urea Nitrogen 16.0 8.0 - 23.0 mg/dL    Creatinine 0.84 0.67 - 1.17 mg/dL    GFR Estimate >90 >60 mL/min/1.73m2    Calcium 8.9 8.8 - 10.4 mg/dL    Glucose 110 (H) 70 - 99 mg/dL   Creatinine   Result Value Ref Range    Creatinine 0.81 0.67 - 1.17 mg/dL    GFR Estimate >90 >60 mL/min/1.73m2       Clinically Significant Risk Factors Present on Admission     "       # Hypocalcemia: Lowest Ca = 8.5 mg/dL in last 2 days, will monitor and replace as appropriate                   # Obesity: Estimated body mass index is 38.01 kg/m  as calculated from the following:    Height as of this encounter: 1.778 m (5' 10\").    Weight as of this encounter: 120.2 kg (264 lb 14.4 oz).                   "

## 2025-07-10 NOTE — PROGRESS NOTES
Admission/Transfer from: ED  2 RN skin assessment completed. Yes Carmina RN  Significant findings include: None  WOC Nurse Consult Ordered? N/A

## 2025-07-10 NOTE — PLAN OF CARE
Goal Outcome Evaluation:      Plan of Care Reviewed With: patient, spouse            Admission:admitted 7/9/2025 with bradycardia and concerns of high degree heart block with possible need for permanent pacemaker placement.     Hx: heterozygous factor V Leiden gene mutation, history of iron overload without HFE gene mutation, obesity, obstructive sleep apnea, past anticoagulation and prior history of recurrent distal lower extremity DVT,     Orientation: alert and oriented x 4.    VS/O2: VSS, on room air    Pain: denies    Tele/Cardiac: SR, 2nd AVB type 2. HR range 30s-50s    Respiratory: WNL    : WNL    GI: WNL    Skin/Wounds/Incisions: intact  IV/Lines/Drains: PIV on right hand saline lock    Pills: whole    Diet:regular  Fluid Restriction:none    Activity Level: bedrest with bathroom privileges  Today:  Cardiac MRI  Echo- ef 55-60%  Stress Test

## 2025-07-10 NOTE — PROGRESS NOTES
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      High degree AV block alternating with Mobitz type I AV block, asymptomatic  *Hospital admission for bradycardia, for details see admission note. Giving blood at hematology clinic day of admission per routine with incidental finding of bradycardia, asymptomatic.  No history of heart disease or bradycardia reported.  ER vitals reviewed heart rate 40, 35 on EKG, temperature 97.7  F, blood pressure 120/78, respiratory rate 16, O2 saturation 97%  Admission labs hemoglobin 14.7, platelets 228,000, TSH 1.36, creatinine 0.8, calcium 8.5, magnesium 2.2, potassium 4.2  EKG sinus bradycardia second-degree AV block, ventricular rate 35 bpm  - Admit to inpatient.   - Telemetry.   - Cardiology consultation and subsequent EP consultation, possible need for permanent pacemaker. Treadmill ECG to assess where in the AVN does block occur, echocardiogram and cardiac MRI obtained and pending.   - Patient given small amount of IVF. Discontinue.   - EP advanced diet s/p treadmill ECG. Awaiting results.    - Continue to monitor with morning labs.      History of lower extremity DVT of left distal lower extremity (04/2020, 07/2024)  Heterozygous factor V Leiden gene mutation  History of iron overload with monthly phlebotomy prior to admission  *Follows with Heme/Onc as outpatient for iron overload syndrome and prior  "history of DVT. Not presently on anticoagulation as discontinued 01/2025 by Heme/Onc although noted to have previously been on direct oral anticoagulant (DOAC). Over the past 7 months donating blood for iron overload under the direction of hematology.   - Monitor for symptoms, DVT prophylaxis per protocol, follow-up with outpatient provider.  As above, not currently anticoagulation, discontinued 11/2024.     Obstructive sleep apnea (CHAD)  - Continue prior to admission CPAP  - Monitor oxygen saturations, encourage incentive spirometry     Obesity  Past Body mass index is 38.02 kg/m ., past weight 265 lbs 0 oz  - Consider future nutrition consult for education, counseling, long-term weight loss  - Lifestyle modifications including diet, weight loss, exercise  - Follow-up with outpatient provider       Observation Goals: -diagnostic tests and consults completed and resulted, -vital signs normal or at patient baseline, -returns to baseline functional status, -safe disposition plan has been identified, -EP/cardiology consulted and decision regarding need for pacemaker (PPM) is made, Nurse to notify provider when observation goals have been met and patient is ready for discharge.  Diet: NPO for Medical/Clinical Reasons Except for: Meds    DVT Prophylaxis: Pneumatic Compression Devices  Santoro Catheter: Not present  Lines: None     Cardiac Monitoring: ACTIVE order. Indication: Bradycardias (48 hours)  Code Status: Full Code      Clinically Significant Risk Factors Present on Admission           # Hypocalcemia: Lowest Ca = 8.5 mg/dL in last 2 days, will monitor and replace as appropriate                   # Obesity: Estimated body mass index is 38.01 kg/m  as calculated from the following:    Height as of this encounter: 1.778 m (5' 10\").    Weight as of this encounter: 120.2 kg (264 lb 14.4 oz).              Social Drivers of Health            Disposition Plan     Medically Ready for Discharge: Anticipated Tomorrow   "     The patient's care was discussed with the Attending Physician, Dr. Mo.    Lindsay Burdick PA-C  Hospitalist Service  [facility]  Securely message with [employer] (more info)  Text page via [facility] Paging/Directory   ______________________________________________________________________    Interval History   No events overnight. Bradycardic but VSS. Afebrile. Remains asymptomatic without chest pain, SOB, palpitations, lightheadedness/dizziness, N/V. Awaiting results. Tolerating diet. No additional concerns or complaints at this time.     Physical Exam   Vital Signs: Temp: 97.3  F (36.3  C) Temp src: Oral BP: 120/73 Pulse: 57   Resp: 18 SpO2: 96 % O2 Device: None (Room air)    Weight: 264 lbs 14.4 oz    GENERAL:  Pleasant, cooperative, alert.   HEENT: Normocephalic, atraumatic.   PULMONOLOGY: Clear to auscultation bilaterally.  CARDIAC: Regular rate and rhythm.    ABDOMEN: Soft, nontender non distended.   MUSCULOSKELETAL:  Moving x 4 spontaneously with CMS intact x4.  Normal bulk and tone.  No LE edema.  Radial pulses 2+ bilaterally.    NEURO: Alert and oriented x3. Nonfocal exam.    Medical Decision Making       45 MINUTES SPENT BY ME on the date of service doing chart review, history, exam, documentation & further activities per the note.      Data   ------------------------- PAST 24 HR DATA REVIEWED -----------------------------------------------    I have personally reviewed the following data over the past 24 hrs:    N/A  \   N/A   / N/A     139 105 16.0 /  110 (H)   4.7 24 0.81 \     TSH: N/A T4: N/A A1C: N/A       Imaging results reviewed over the past 24 hrs:   Recent Results (from the past 24 hours)   Stress Test - Adult    Narrative    [id number]  [id number]  [id number]  [id number]^SURENDRA^GARRICK^[facility]  Echocardiography Laboratory  [address] [place] [id number]     Name: ABHAY ROMO  MRN: [id number]  : 1960  Study Date:  07/10/2025 09:24 AM  Age: 65 yrs  Gender: Male  Patient Location: [facility]  Reason For Study: assess heart block  Ordering Physician: Amador Bermeo MD  Referring Physician: Sachin Dozier  Performed By: Kyara Owens     BSA: 2.3 m2  Height: 70 in  Weight: 264 lb  HR: 50  BP: 125/86 mmHg  ______________________________________________________________________________  Procedure  Treadmill stress test.  ______________________________________________________________________________  Interpretation Summary  The resting ECG prior to exercise demonstrated normal sinus rhythm with  second-degree AV block, type I (wenckebach)  ECG during exercise showed sinus rhythm and sinus tachycardia without evidence  of AV block. No ischemic changes were identified. In recovery, second-degree  AV block type I returned.     The patient exercised 7:03 Min.  The patient exhibited no chest pain during exercise.     No evidence for high-grade AV block or ischemia during exercise treadmill  stress testing.  ______________________________________________________________________________  Baseline  The patient is in normal sinus rhythm.  Second-degree AV block, type I (wenckebach).     Stress  The patient exercised 7:03 Min.  RPP 29,760.  The patient exhibited no chest pain during exercise.  There was a borderline hypertensive BP response to exercise.  Exercise was stopped due to fatigue.  A treadmill exercise test according to the Justo protocol was performed.  Target Heart Rate was achieved.  ECG during exercise showed sinus rhythm and sinus tachycardia without evidence  of AV block. No ischemic changes were identified. In recovery, second-degree  AV block type I returned.     Stress Results  Protocol:  Justo          Maximum Predicted HR:   153 bpm  Target HR: 130 bpm        % Maximum Predicted HR: 101 %     Stage  DurationHeart Rate  BP           Comment        (mm:ss)   (bpm)  Stage 1   3:00      100   142/74  Stage 2   3:00      136    168/76  Stage 3   1:03      155   192/78  RecoveryR  6:00      68    148/80FAC AVG DUKE 7 RPP 29,760     Stress Duration:   7:03 mm:ss        Recovery Time: 6:00 mm:ss  Maximum Stress HR: 155 bpm           METS:          10     ______________________________________________________________________________  ______________________________________________________________________________  Report approved by: Charan Mercer MD on 07/10/2025 12:18 PM

## 2025-07-10 NOTE — PROVIDER NOTIFICATION
MD Notification    Notified Person: MD    Notified Person Name: Elvie CHERRY    Notification Date/Time: 7/10/25 at 0948    Notification Interaction: vocera message    Purpose of Notification:  Cardiac MRI will be done tomorrow at 1230. They are booked today    Orders Received:  Patient can eat.  Stop IV fluids.  Comments:

## 2025-07-11 ENCOUNTER — ORDERS ONLY (AUTO-RELEASED) (OUTPATIENT)
Dept: CARDIOLOGY | Facility: CLINIC | Age: 65
End: 2025-07-11
Payer: COMMERCIAL

## 2025-07-11 VITALS
OXYGEN SATURATION: 96 % | SYSTOLIC BLOOD PRESSURE: 119 MMHG | RESPIRATION RATE: 18 BRPM | TEMPERATURE: 98.2 F | WEIGHT: 264.9 LBS | DIASTOLIC BLOOD PRESSURE: 75 MMHG | HEART RATE: 53 BPM | BODY MASS INDEX: 37.92 KG/M2 | HEIGHT: 70 IN

## 2025-07-11 DIAGNOSIS — I44.30 AV BLOCK: ICD-10-CM

## 2025-07-11 PROCEDURE — 99232 SBSQ HOSP IP/OBS MODERATE 35: CPT | Mod: FS | Performed by: INTERNAL MEDICINE

## 2025-07-11 PROCEDURE — 99239 HOSP IP/OBS DSCHRG MGMT >30: CPT

## 2025-07-11 ASSESSMENT — ACTIVITIES OF DAILY LIVING (ADL)
ADLS_ACUITY_SCORE: 53

## 2025-07-11 NOTE — PLAN OF CARE
Goal Outcome Evaluation:    Pt. Discharged to home with spouse. Discharge instructions reviewed with pt. And spouse at bedside. Pt. And spouse verbalize understanding of instructions.

## 2025-07-11 NOTE — PROGRESS NOTES
[facility]  Cardiology Progress Note  Date of Service: 07/11/2025  Primary Cardiologist: Dr. Vazquez, Dr. Bermeo (EP)    Assessment & Plan   Quentin Wheeler is a 65 year old male with past medical history significant for heterozygous factor V Leiden gene mutation, hemochromatosis requiring monthly phlebotomy admitted on 7/9/2025 with asymptomatic AV Wenckebach and intermittent 2:1 AV block noted upon completion of phlebotomy. EP was consulted.     Treadmill ECG was completed 7/10/2025 that revealed AV Wenckebach at rest but normal AV conduction with exercise/tachycardia. In recovery, AV Wenckebach returned.     Assessment:  1.  AV Wenckebach with intermittent 2:1 AV block, asymptomatic. Cardiac MRI without abnormalities. Echo with normal LVEF. Treadmill stress with normal AV conduction with exertion. No current indication for permanent pacemaker.   2.  Hemochromatosis requiring monthly phlebotomy; no cardiac involvement noted on cardiac MRI    Plan:   No current indication for permanent pacemaker. Educated patient on sx to monitor for/when to seek care  Continue to avoid AV feliciano blocking agents  Outpatient EP follow-up, will arrange  Okay to discharge from EP perspective    ALBIN Orr [facility]  Pager: [phone]    Interval History   No complaints. Resting in bed. Anxious for discharge.     Physical Exam   Temp: 98.2  F (36.8  C) Temp src: Oral BP: 119/75 Pulse: 53   Resp: 18 SpO2: 96 % O2 Device: None (Room air)    Vitals:    07/09/25 1430 07/09/25 1730 07/10/25 0400   Weight: 120.2 kg (265 lb) 121.5 kg (267 lb 14.4 oz) 120.2 kg (264 lb 14.4 oz)       GEN: well nourished, in no acute distress.  HEENT:  Pupils equal, round. Sclerae nonicteric.   NECK: Supple, no masses appreciated. No JVD  C/V:  irregular rhythm, intermittently bradycardic  RESP: Respirations are unlabored. Clear to auscultation bilaterally without wheezing, rales, or rhonchi.  GI: Abdomen  soft, nontender.  EXTREM: no LE edema.  NEURO: Alert and oriented, cooperative.  SKIN: Warm and dry.     Medications   Current Facility-Administered Medications   Medication Dose Route Frequency Provider Last Rate Last Admin    sodium chloride 0.9 % infusion   Intravenous Continuous Nestor Murillo MD   Stopped at 07/10/25 1051     Current Facility-Administered Medications   Medication Dose Route Frequency Provider Last Rate Last Admin    sodium chloride (PF) 0.9% PF flush 3 mL  3 mL Intravenous Q8H Nancy Avila MD, MD           Data   Last 24 hours labs reviewed     Tele: AV Wenckebach with intermittent 2:1 AVB     Exercise stress test 7/10/2025  Interpretation Summary  The resting ECG prior to exercise demonstrated normal sinus rhythm with  second-degree AV block, type I (wenckebach)  ECG during exercise showed sinus rhythm and sinus tachycardia without evidence  of AV block. No ischemic changes were identified. In recovery, second-degree  AV block type I returned.  The patient exercised 7:03 Min.  The patient exhibited no chest pain during exercise.  No evidence for high-grade AV block or ischemia during exercise treadmill  stress testing.    Echo 7/10/2025  The left ventricle is normal in size.  Left ventricular systolic function is normal.  The visual ejection fraction is 55-60%.  Normal left ventricular wall motion  The right ventricle is normal in structure, function and size.  No hemodynamically significant valvular abnormalities on 2D or color flow  imaging. There is no comparison study available.    cMRI 7/10/2025  1. Normal left ventricular size and systolic function. Calculated ejection fraction is 64%.  2.  Normal right ventricular size and systolic function.  3.  Normal myocardial T2*at 45 ms.  No evidence of myocardial siderosis.  4. Normal late gadolinium enhancement images without evidence of myocardial infarction, fibrosis, or myocarditis.

## 2025-07-11 NOTE — PLAN OF CARE
Goal Outcome Evaluation:  DATE & TIME:7/11/25, 2046-8444  Cognitive Concerns/ Orientation:A/Ox4  BEHAVIOR & AGGRESSION TOOL COLOR:Green  ABNL VS/O2:VSS on RA ex eugenia  MOBILITY:SBA  PAIN MANAGMENT:Denies  DIET:NPO  BOWEL/BLADDER:Continent of B/B  DRAIN/DEVICES:PIV SL  TELEMETRY RHYTHM:SR, 2nd AVB type 2  SKIN: WDL  D/C DATE: Pending  OTHER IMPORTANT INFO: bedrest with bathroom privileges

## 2025-07-11 NOTE — DISCHARGE SUMMARY
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    Weight as of this encounter: 120.2 kg (264 lb 14.4 oz).       Follow-ups Needed After Discharge   Follow-up Appointments       Hospital Follow-up with Existing Primary Care Provider (PCP)          Schedule Primary Care visit within: 7 Days       Follow Up      Follow up with your EP team for hospital follow-up. Should you need to reschedule or contact a member of the EP team, please call [phone] to be directed to the EP service.     Follow-up with your Hematologist/Oncologist team for continued evaluation and treatment of your iron overload syndrome and guidance regarding anticoagulation given your history of deep venous thrombosis (DVT).              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 Scheduled phlebotomy for treatment of iron overload syndrome in hematology clinic day of admission with incidental finding of bradycardia (HRs in 30s). Patient reports asymptomatic.  No history of heart disease or bradycardia reported. Patient brought to [facility] ED for further evaluation.   *Upon arrival to [facility] ED, patient VSS although bradycardic (HR 40). /78. RR 16. Oxygen saturation 97%. Afebrile. Work-up notable for ECGs demonstrating sinus bradycardia with AV Wenckebach and 2:1 AV block. Labs generally unremarkable with Hgb 14.7. PLTs 228,000. TSH 1.36. Creatinine 0.8. Calcium 8.5. Magnesium 2.2. Potassium 4.2  *Patient admitted to inpatient. Telemetry ordered. Patient given small amount of IVF given initially kept NPO. Cardiology consultation and subsequent EP consultation upon admission given possible need for permanent pacemaker. EP obtained treadmill ECG to assess where in the AVN does block occur, echocardiogram and cardiac MRI. Echo with normal LVEF. Treadmill stress with normal AV conduction with exertion. No cardiac involvement noted on cardiac MRI. Given findings, EP felt no current indication for permanent pacemaker. Patient discharged with two week cardiac monitor and close follow-up with EP.     History of DVT of left distal lower extremity (04/2020, 07/2024)  Heterozygous factor V Leiden gene mutation  History of iron overload with monthly phlebotomy prior to admission  *Follows with Heme/Onc as outpatient for iron overload syndrome and prior history of DVT. Not presently on anticoagulation as discontinued 01/2025 by Heme/Onc although noted to have previously been on direct oral anticoagulant (DOAC). Over the past 7 months donating blood for iron overload under the direction of hematology.Patient instructed to follow-up with Heme/Onc and PCP upon discharge for continued evaluation and  management.      Obstructive sleep apnea (CHAD)  *Continued prior to admission CPAP upon admission. Encouraged compliance.      Obesity  *Past Body mass index is 38.02 kg/m ., past weight 265 lbs 0 oz. Follow-up with PCP upon discharge for continued evaluation and management.      Consultations This Hospital Stay   CARDIOLOGY IP CONSULT  ELECTROPHYSIOLOGY IP CONSULT    Code Status   Prior    Time Spent on this Encounter   Lindsay DAILEY PA-C, personally saw the patient today and spent greater than 30 minutes discharging this patient. The patient was discussed with attending physician Dr. Guillen.        Lindsay Burdick PA-C  [facility] HEART CARE  [address] [place] [address], [address]  [facility] [address]  Phone: [phone]  ______________________________________________________________________    Physical Exam   Vital Signs:                    Weight: 264 lbs 14.4 oz    GENERAL:  Pleasant, cooperative, alert.   HEENT: Normocephalic, atraumatic.   PULMONOLOGY: Clear to auscultation bilaterally.  CARDIAC: Bradycardic rate and irregualr rhythm.    ABDOMEN: Soft, nontender non distended.   MUSCULOSKELETAL:  Moving x 4 spontaneously with CMS intact x4.  Normal bulk and tone.  No LE edema.  Radial pulses 2+ bilaterally.    NEURO: Alert and oriented x3. Nonfocal exam.     Primary Care Physician   Sachin Dozier    Discharge Orders      Follow-Up with Cardiology EP      Activity    Your activity upon discharge: activity as tolerated and no driving for today.     CPAP - Continue Home CPAP    Continue Home CPAP per home equipment settings.     Reason for your hospital stay    You were hospitalized for further evaluation of bradycardia (slow heart rate) and abnormal EKG with evidence of AV block (a heart rhythm disorder that can cause the heart to beat more slowly than it should). You were seen by Cardiology and Electrophysiology (EP) where an extensive cardiac work-up was completed and  included a cardiac MRI, echocardiogram and treadmill stress test. Your cardiac MRI was without abnormalities. Echocardiogram noted to have a normal LVEF. In addition, your treadmill stress test demonstrated a normal AV conduction with exertion. Given these findings, there was no indication for permanent pacemaker per EP.     To continue to monitor your heart rate and rhythm upon your discharge, you are being sent home with a heart monitor to monitor your heart when you leave the hospital. The monitor will be mailed to you and you will return this after wearing it for 14 days.     As discussed prior to discharge, contact your EP team or present to the ED should you develop lightheadedness, dizziness, syncopal event/loss of consciousness, fatigue, chest pain or shortness of breath. Avoid medications that may worsen AV block such as beta-blockers (metoprolol, propranolol, carvedilol), calcium channel blockers (amlodipine, nifedipine) or medications such as digoxin. Follow up with your EP team for hospital follow-up. Should you need to reschedule or contact a member of the EP team, please call [phone] to be directed to the EP service.     Follow-up with your Hematologist/Oncologist team for continued evaluation and treatment of your iron overload syndrome and guidance regarding anticoagulation given your history of deep venous thrombosis (DVT).     Follow Up    Follow up with your EP team for hospital follow-up. Should you need to reschedule or contact a member of the EP team, please call [phone] to be directed to the EP service.     Follow-up with your Hematologist/Oncologist team for continued evaluation and treatment of your iron overload syndrome and guidance regarding anticoagulation given your history of deep venous thrombosis (DVT).     Diet    Follow this diet upon discharge: Current Diet:Orders Placed This Encounter      Regular Diet Adult     Hospital Follow-up with Existing Primary Care Provider  (PCP)          ZIO PATCH MAIL OUT       Significant Results and Procedures   Most Recent 3 CBC's:  Recent Labs   Lab Test 25  1441 25  1023 25  0819   WBC 8.3 7.5  --    HGB 14.7 15.6 15.2   MCV 90 88 90    231  --      Most Recent 3 BMP's:  Recent Labs   Lab Test 07/10/25  0804 07/10/25  0616 25  1441 25  1023   NA  --  139 139 140   POTASSIUM  --  4.7 4.2 4.8   CHLORIDE  --  105 105 104   CO2  --  24 24 26   BUN  --  16.0 18.8 20.6   CR 0.81 0.84 0.80 0.95   ANIONGAP  --  10 10 10   CAMPBELL  --  8.9 8.5* 9.5   GLC  --  110* 93 105*     Most Recent 3 Hemoglobins:  Recent Labs   Lab Test 25  1441 25  1023 25  0819   HGB 14.7 15.6 15.2     Most Recent TSH and T4:  Recent Labs   Lab Test 25  1441   TSH 1.36     Most Recent Hemoglobin A1c:No lab results found.  Most Recent 6 glucoses:  Recent Labs   Lab Test 07/10/25  0616 25  1441 25  1023   * 93 105*   ,   Results for orders placed or performed during the hospital encounter of 25   MR Cardiac w contrast    Narrative                                                     [facility]                                                     CMR Report      MRN:                 [id number]                                  Name:        ABHAY ROMO                                  :                                                  Scan Date:          2025-Jul-10                                  Electronically signed by Nancy Avila 2025-Jul-10 16:04:59    VITALS   ==========================================================================================================    HEIGHT: 70.0 in    (177.8 cm)  WEIGHT: 264.0 lbs    (119.8 kgs)  BSA: 2.35 m^2    FINAL IMPRESSION   ==========================================================================================================    1. Normal left ventricular size and systolic function. Calculated ejection fraction is 64%.  2.  Normal  right ventricular size and systolic function.  3.  Normal myocardial T2*at 45 ms.  No evidence of myocardial siderosis.  4. Normal late gadolinium enhancement images without evidence of myocardial infarction, fibrosis, or  myocarditis.    SUMMARY   ==========================================================================================================    LEFT VENTRICLE: Normal left ventricular size.  Normal left ventricular wall thickness. Normal left ventricular systolic  function.  Calculated left ventricular ejection fraction is 64%.  No regional wall motion abnormality.    LGE: Normal late gadolinium enhancement images without evidence of myocardial infarction, fibrosis, or  myocarditis. Hyperenhancement is normal.    MYOCARDIUM: The myocardial T2* is 45 ms.  No evidence of myocardial siderosis.    RIGHT VENTRICLE: Normal right ventricular size. Normal right ventricular systolic function.    LA/RA SEPTUM: Grossly intact atrial septum.    LEFT ATRIUM: Normal left atrial size.    RIGHT ATRIUM: Normal right atrial size.    PERICARDIUM: Normal thickness pericardium.  No pericardial effusion.    PLEURAL EFFUSION: No pleural effusion.    AORTIC VALVE: Aortic valve not well visualized.  No aortic stenosis or regurgitation.    MITRAL VALVE: Normal mitral valve.  No mitral regurgitation.    TRICUSPID VALVE: Normal tricuspid valve.  No tricuspid regurgitation.    PULMONIC VALVE: Normal pulmonic valve. No pulmonic stenosis or regurgitation.    AORTIC ROOT: Normal aortic sinus of Valsalva dimension.  Normal ascending aorta dimension.      CORE EXAM   ==========================================================================================================    MEASUREMENTS   ----------------------------------------------------------------------------------------      VOLUMETRIC ANALYSIS       ----------------------------------------------  .-------------------------------------------------------.                   LV      Reference   RV  Reference   +-----+-----------+-------+------------+----+-----------+   EDV  ml         171.2   (109-191)                        ml/m^2      72.9   (60-95)                     ESV  ml          61.5   (27-72)                          ml/m^2      26.2   (14-36)                     CO   L/min       4.72                                    L/min/m^2   2.01                               SV   ml         109.7   ()                         ml/m^2      46.7   (40-64)                     EF   %           64.1   (58-76)                    '-----+-----------+-------+------------+----+-----------'            CARDIAC OUTPUT HR:  43 BPM      LV DIMENSIONS       ----------------------------------------------          WALL THICKNESS - ANTEROSEPTAL:  1.1 cm          WALL THICKNESS - INFEROLATERAL:  0.9 cm          LV JUVENCIO:  5.6 cm          LV ESD:  3.6 cm        IRON QUANTIFICATION       ----------------------------------------------          MYOCARDIAL T2*:  45 msec      17 SEGMENT   ----------------------------------------------------------------------------------------  .------------------------------------------------------------------------------------------.   Segments            Wall Motion   Hyperenhancement  Stress Perfusion  Interpretation   +--------------------+--------------+------------------+------------------+----------------+   Base Anterior       Normal/Hyper  None                                                  Base Anteroseptal   Normal/Hyper  None                                                  Base Inferoseptal   Normal/Hyper  None                                                  Base Inferior       Normal/Hyper  None                                                  Base Inferolateral  Normal/Hyper  None                                                  Base Anterolateral  Normal/Hyper   None                                                  Mid Anterior        Normal/Hyper  None                                                  Mid Anteroseptal    Normal/Hyper  None                                                  Mid Inferoseptal    Normal/Hyper  None                                                  Mid Inferior        Normal/Hyper  None                                                  Mid Inferolateral   Normal/Hyper  None                                                  Mid Anterolateral   Normal/Hyper  None                                                  Apical Anterior     Normal/Hyper  None                                                  Apical Septal       Normal/Hyper  None                                                  Apical Inferior     Normal/Hyper  None                                                  Apical Lateral      Normal/Hyper  None                                                  Alum Bridge                Normal/Hyper  None                                                 +--------------------+--------------+------------------+------------------+----------------+   RV Segments         Wall Motion   Hyperenhancement                    Interpretation   +--------------------+--------------+------------------+------------------+----------------+   RV Basal Anterior                                                                       RV Basal Inferior                                                                       RV Mid                                                                                  RV Apical                                                                              '--------------------+--------------+------------------+------------------+----------------'        FINDINGS       ----------------------------------------------          LV SCAR SIZE (17 SEGMENT):  0  %      SCAN INFO   ==========================================================================================================    GENERAL   ----------------------------------------------------------------------------------------      SCANNER       ----------------------------------------------          :  SIEMENS          MODEL:  Aera        CONTRAST AGENT       ----------------------------------------------          TYPE:  Gadavist          GD CONCENTRATION:  0.5 M        SETUP       ----------------------------------------------          ATTENDING PHYSICIAN:  CONNER GONZALES      Report generated by [employer], a product of [employer]   Stress Test - Adult    Narrative    [id number]  [id number]  [id number]  [id number]^SURENDRA^GARRICK^LUZ MARINA     [facility]  Echocardiography Laboratory  [address] [place] [address]     Name: ABHAY ROMO  MRN: [id number]  : 1960  Study Date: 07/10/2025 09:24 AM  Age: 65 yrs  Gender: Male  Patient Location: [facility]  Reason For Study: assess heart block  Ordering Physician: Garrick Bermeo MD  Referring Physician: Sachin Dozier  Performed By: Kyara Owens     BSA: 2.3 m2  Height: 70 in  Weight: 264 lb  HR: 50  BP: 125/86 mmHg  ______________________________________________________________________________  Procedure  Treadmill stress test.  ______________________________________________________________________________  Interpretation Summary  The resting ECG prior to exercise demonstrated normal sinus rhythm with  second-degree AV block, type I (wenckebach)  ECG during exercise showed sinus rhythm and sinus tachycardia without evidence  of AV block. No ischemic changes were identified. In recovery, second-degree  AV block type I returned.     The patient exercised 7:03 Min.  The patient exhibited no chest pain during exercise.     No evidence for high-grade AV block or ischemia during exercise treadmill  stress  testing.  ______________________________________________________________________________  Baseline  The patient is in normal sinus rhythm.  Second-degree AV block, type I (wenckebach).     Stress  The patient exercised 7:03 Min.  RPP 29,760.  The patient exhibited no chest pain during exercise.  There was a borderline hypertensive BP response to exercise.  Exercise was stopped due to fatigue.  A treadmill exercise test according to the Justo protocol was performed.  Target Heart Rate was achieved.  ECG during exercise showed sinus rhythm and sinus tachycardia without evidence  of AV block. No ischemic changes were identified. In recovery, second-degree  AV block type I returned.     Stress Results  Protocol:  Justo          Maximum Predicted HR:   153 bpm  Target HR: 130 bpm        % Maximum Predicted HR: 101 %     Stage  DurationHeart Rate  BP           Comment        (mm:ss)   (bpm)  Stage 1   3:00      100   142/74  Stage 2   3:00      136   168/76  Stage 3   1:03      155   192/78  RecoveryR  6:00      68    148/80FAC AVG DUKE 7 RPP 29,760     Stress Duration:   7:03 mm:ss        Recovery Time: 6:00 mm:ss  Maximum Stress HR: 155 bpm           METS:          10     ______________________________________________________________________________  ______________________________________________________________________________  Report approved by: Charan Mercer MD on 07/10/2025 12:18 PM         Echocardiogram Complete     Value    LVEF  55-60%    Narrative    [id number]  [id number]  [id number]  ^SALOMÓN^MARIN^OLIVA     [facility]  Echocardiography Laboratory  [address] [place] [address]     Name: ABHAY ROMO  MRN: [id number]  : 1960  Study Date: 07/10/2025 10:59 AM  Age: 65 yrs  Gender: Male  Patient [facility]: [facility]  Reason For Study: Bradycardia - Sinus  Ordering Physician: MARIN LORENZANA  Referring Physician: Sachin Dozier MD  Performed By: Christine Rosales     BSA:  2.3 m2  Height: 70 in  Weight: 264 lb  HR: 67  BP: 120/73 mmHg  ______________________________________________________________________________  Procedure  Echocardiogram with two-dimensional, color and spectral Doppler. Definity (NDC  #97525-321) given intravenously.  ______________________________________________________________________________  Interpretation Summary     The left ventricle is normal in size.  Left ventricular systolic function is normal.  The visual ejection fraction is 55-60%.  Normal left ventricular wall motion  The right ventricle is normal in structure, function and size.  No hemodynamically significant valvular abnormalities on 2D or color flow  imaging. There is no comparison study available.  ______________________________________________________________________________  Left Ventricle  The left ventricle is normal in size. There is normal left ventricular wall  thickness. Left ventricular systolic function is normal. The visual ejection  fraction is 55-60%. Normal left ventricular wall motion.     Right Ventricle  The right ventricle is normal in structure, function and size.     Atria  Normal left atrial size. Right atrial size is normal. There is no atrial shunt  seen.     Mitral Valve  The mitral valve is normal in structure and function. There is trace mitral  regurgitation.     Tricuspid Valve  The tricuspid valve is normal in structure and function. There is trace  tricuspid regurgitation. IVC diameter <2.1 cm collapsing >50% with sniff  suggests a normal RA pressure of 3 mmHg. Right ventricular systolic pressure  could not be approximated due to inadequate tricuspid regurgitation.     Aortic Valve  The aortic valve is normal in structure and function. No aortic regurgitation  is present. No aortic stenosis is present.     Pulmonic Valve  The pulmonic valve is not well seen, but is grossly normal. There is trace  pulmonic valvular regurgitation.     Vessels  Normal size aorta.      Pericardium  There is no pericardial effusion.     Rhythm  Sinus rhythm was noted.  ______________________________________________________________________________  MMode/2D Measurements & Calculations  IVSd: 1.2 cm     LVIDd: 5.2 cm  LVIDs: 3.4 cm  LVPWd: 1.2 cm  FS: 35.5 %  LV mass(C)d: 243.1 grams  LV mass(C)dI: 103.5 grams/m2  Ao root diam: 3.8 cm  asc Aorta Diam: 3.4 cm  Ao root diam index Ht(cm/m): 2.1  Ao root diam index BSA (cm/m2): 1.6  Asc Ao diam index BSA (cm/m2): 1.5  Asc Ao diam index Ht(cm/m): 1.9  LA Volume (BP): 49.7 ml     LA Volume Index (BP): 21.1 ml/m2  RV Base: 2.8 cm  RWT: 0.45  TAPSE: 2.1 cm     Doppler Measurements & Calculations  MV E max billy: 56.6 cm/sec  MV A max billy: 60.3 cm/sec  MV E/A: 0.94  MV dec time: 0.25 sec  Ao V2 max: 96.4 cm/sec  Ao max P.0 mmHg  Ao V2 mean: 66.3 cm/sec  Ao mean P.0 mmHg  Ao V2 VTI: 20.0 cm  LV V1 max P.8 mmHg  LV V1 max: 109.0 cm/sec  LV V1 VTI: 21.4 cm  PA acc time: 0.11 sec  AV Billy Ratio (DI): 1.1  E/E' av.4  Lateral E/e': 6.5  Medial E/e': 8.4  RV S Billy: 12.5 cm/sec     ______________________________________________________________________________  Report approved by: Charan Mercer MD on 07/10/2025 04:02 PM             Discharge Medications      Review of your medicines        CONTINUE these medicines which have NOT CHANGED        Dose / Directions   psyllium capsule  Commonly known as: METAMUCIL/KONSYL      Dose: 1 capsule  Take 1 capsule by mouth daily.  Refills: 0     vitamin C 1000 MG Tabs  Commonly known as: ASCORBIC ACID      Dose: 1,000 mg  Take 1,000 mg by mouth daily.  Refills: 0     Vitamin D2 50 MCG (2000 UT) Tabs      Dose: 2,000 Units  Take 2,000 Units by mouth daily.  Refills: 0     zinc 50 MG Tabs      Dose: 50 mg  Take 50 mg by mouth daily.  Refills: 0            Allergies   Allergies   Allergen Reactions    Tetracycline Rash

## 2025-07-15 ENCOUNTER — TELEPHONE (OUTPATIENT)
Dept: CARDIOLOGY | Facility: CLINIC | Age: 65
End: 2025-07-15
Payer: COMMERCIAL

## 2025-07-15 NOTE — TELEPHONE ENCOUNTER
Patient was admitted to [facility] on 7/9/25 with asymptomatic AV Wenckebach and intermittent 2:1 AV block noted upon completion of phlebotomy. EP was consulted.     PMH: heterozygous factor V Leiden gene mutation, hemochromatosis requiring monthly phlebotomy.     7/10/25: Treadmill ECG was completed and revealed AV Wenckebach at rest but normal AV conduction with exercise/tachycardia. In recovery, AV Wenckebach returned.     7/10/25: cMRI without abnormalities.    No medication changes. 14 day Zio Patch monitor to be mailed out.    Called patient to discuss any post hospital d/c questions he may have and confirm f/u appts.     Patient denies/has no complaints of any SOB, chest pain, palpitations or light headedness.    RN confirmed with patient that he Pt is scheduled for an OV on 10/9/25 at 1310 with MERCEDES Blayne Cam at our [facility]. Dr. Bermeo's Team RN phone number provided.    Verbalized understanding of all information without any further questions. Advised to call clinic with any cardiac related questions or concerns prior to this mercedes't. KESHA Omalley RN.

## 2025-07-15 NOTE — TELEPHONE ENCOUNTER
Pt wife called for pt asking about his ZP that has not arrived yet and wondering if it would be best for pt to wear when he is back from [place]. Pt flies out 7/30 an back on 8/4. Pt saw Dr Bermeo in the hospital, so will ask this of him and will then call pt back with recommendation.  A Consent to communicate has been emailed to pt at [email or web address].  Lilly

## 2025-08-07 LAB — CV ZIO PRELIM RESULTS: NORMAL

## 2025-08-12 ENCOUNTER — INFUSION THERAPY VISIT (OUTPATIENT)
Dept: INFUSION THERAPY | Facility: CLINIC | Age: 65
End: 2025-08-12
Attending: INTERNAL MEDICINE
Payer: COMMERCIAL

## 2025-08-12 VITALS
DIASTOLIC BLOOD PRESSURE: 54 MMHG | OXYGEN SATURATION: 98 % | RESPIRATION RATE: 18 BRPM | BODY MASS INDEX: 38.84 KG/M2 | SYSTOLIC BLOOD PRESSURE: 125 MMHG | WEIGHT: 270.7 LBS | HEART RATE: 41 BPM | TEMPERATURE: 97.8 F

## 2025-08-12 DIAGNOSIS — E83.19 IRON OVERLOAD SYNDROME: Primary | ICD-10-CM

## 2025-08-12 DIAGNOSIS — E83.19 IRON OVERLOAD SYNDROME: ICD-10-CM

## 2025-08-12 LAB
FERRITIN SERPL-MCNC: 226 NG/ML (ref 31–409)
HCT VFR BLD AUTO: 45.1 % (ref 40–53)
HGB BLD-MCNC: 14.8 G/DL (ref 13.3–17.7)
HOLD SPECIMEN: NORMAL
IRON BINDING CAPACITY (ROCHE): 316 UG/DL (ref 240–430)
IRON SATN MFR SERPL: 28 % (ref 15–46)
IRON SERPL-MCNC: 88 UG/DL (ref 61–157)
MCV RBC AUTO: 88 FL (ref 78–100)

## 2025-08-12 PROCEDURE — 36415 COLL VENOUS BLD VENIPUNCTURE: CPT | Performed by: INTERNAL MEDICINE

## 2025-08-12 PROCEDURE — 82728 ASSAY OF FERRITIN: CPT | Performed by: INTERNAL MEDICINE

## 2025-08-12 PROCEDURE — 99195 PHLEBOTOMY: CPT

## 2025-08-12 PROCEDURE — 99207 PR NO CHARGE LOS: CPT

## 2025-08-12 PROCEDURE — 83550 IRON BINDING TEST: CPT | Performed by: INTERNAL MEDICINE

## 2025-08-12 PROCEDURE — 85014 HEMATOCRIT: CPT | Performed by: INTERNAL MEDICINE

## 2025-08-12 RX ORDER — HEPARIN SODIUM (PORCINE) LOCK FLUSH IV SOLN 100 UNIT/ML 100 UNIT/ML
5 SOLUTION INTRAVENOUS
OUTPATIENT
Start: 2025-09-05

## 2025-08-12 RX ORDER — HEPARIN SODIUM,PORCINE 10 UNIT/ML
5-20 VIAL (ML) INTRAVENOUS DAILY PRN
OUTPATIENT
Start: 2025-09-05